# Patient Record
Sex: MALE | Race: WHITE | NOT HISPANIC OR LATINO | Employment: OTHER | URBAN - NONMETROPOLITAN AREA
[De-identification: names, ages, dates, MRNs, and addresses within clinical notes are randomized per-mention and may not be internally consistent; named-entity substitution may affect disease eponyms.]

---

## 2022-03-04 NOTE — PROGRESS NOTES
"Subjective    Mr. Diaz is 81 y.o. male    Chief Complaint: Elevated PSA    History of Present Illness  81-year-old new patient referred for elevated PSA of 8.0 on 2/3/2022 of 4.8 last summer August 2021.  He denies bothersome LUTS, hematuria, or flank pain.  No bone pain.    PSA  Date  8.030  2-3-22  4.810  8-4-21    The following portions of the patient's history were reviewed and updated as appropriate: allergies, current medications, past family history, past medical history, past social history, past surgical history and problem list.    Review of Systems      Current Outpatient Medications:   •  lisinopril-hydrochlorothiazide (PRINZIDE,ZESTORETIC) 20-25 MG per tablet, TAKE 1 TABLET BY MOUTH EVERY DAY FOR 90 DAYS, Disp: , Rfl:     History reviewed. No pertinent past medical history.    History reviewed. No pertinent surgical history.    Social History     Socioeconomic History   • Marital status:        History reviewed. No pertinent family history.    Objective    Temp 98.1 °F (36.7 °C)   Ht 177.8 cm (70\")   Wt 90 kg (198 lb 6.4 oz)   BMI 28.47 kg/m²     Physical Exam  Penis and testicles normal.  Digital rectal exam reveals 40 to 50 g prostate with firm nodule on the right.      No results found for this or any previous visit.  Assessment and Plan    Diagnoses and all orders for this visit:    1. Elevated prostate specific antigen (PSA) (Primary)  -     Cancel: POC Urinalysis Dipstick, Multipro  -     Cancel: PSA DIAGNOSTIC; Future    2. Prostate nodule      Rise in his PSA up to 8 from 4 last year.  We had a long discussion regarding the natural history of PSA including the AUA guidelines for no PSA screening beyond the age of 75.  I recommended he follow-up with me next Thursday in my [facility] office with repeat preclinic PSA for further discussion especially regarding his right-sided nodule.  We discussed options for prostate biopsy, close PSA surveillance, etc.  We will discuss further at visit " next week.      This document has been signed by TITA Berrios MD on March 15, 2022 19:39 CDT

## 2022-03-14 ENCOUNTER — OFFICE VISIT (OUTPATIENT)
Dept: UROLOGY | Facility: CLINIC | Age: 82
End: 2022-03-14

## 2022-03-14 VITALS — TEMPERATURE: 98.1 F | WEIGHT: 198.4 LBS | BODY MASS INDEX: 28.4 KG/M2 | HEIGHT: 70 IN

## 2022-03-14 DIAGNOSIS — R97.20 ELEVATED PROSTATE SPECIFIC ANTIGEN (PSA): ICD-10-CM

## 2022-03-14 DIAGNOSIS — R97.20 ELEVATED PROSTATE SPECIFIC ANTIGEN (PSA): Primary | ICD-10-CM

## 2022-03-14 DIAGNOSIS — N40.2 PROSTATE NODULE: ICD-10-CM

## 2022-03-14 PROCEDURE — 99202 OFFICE O/P NEW SF 15 MIN: CPT | Performed by: UROLOGY

## 2022-03-14 RX ORDER — LISINOPRIL AND HYDROCHLOROTHIAZIDE 25; 20 MG/1; MG/1
TABLET ORAL
COMMUNITY
Start: 2022-02-03

## 2022-03-15 LAB — PSA SERPL-MCNC: 6.58 NG/ML (ref 0–4)

## 2022-03-15 NOTE — PROGRESS NOTES
"Subjective    Mr. Diaz is 81 y.o. male    Chief Complaint: Elevated PSA    History of Present Illness  81-year-old established patient seen last week for history of elevated PSA to 8.0 in February up from 4.8 last summer.  PSA was rechecked last week and found to be elevated but improved down to 6.58.  He denies LUTS, or bone pain.  Rectal exam last week did show for gram probably with firm nodule on the right.    Lab Results   Component Value Date    PSA 6.580 (H) 03/14/2022       The following portions of the patient's history were reviewed and updated as appropriate: allergies, current medications, past family history, past medical history, past social history, past surgical history and problem list.    Review of Systems      Current Outpatient Medications:   •  lisinopril-hydrochlorothiazide (PRINZIDE,ZESTORETIC) 20-25 MG per tablet, TAKE 1 TABLET BY MOUTH EVERY DAY FOR 90 DAYS, Disp: , Rfl:     History reviewed. No pertinent past medical history.    History reviewed. No pertinent surgical history.    Social History     Socioeconomic History   • Marital status:    Tobacco Use   • Smoking status: Never Smoker   • Smokeless tobacco: Never Used   Vaping Use   • Vaping Use: Never used   Substance and Sexual Activity   • Alcohol use: Never   • Drug use: Never   • Sexual activity: Defer       History reviewed. No pertinent family history.    Objective    Temp 97.6 °F (36.4 °C)   Ht 177.8 cm (70\")   Wt 89.8 kg (198 lb)   BMI 28.41 kg/m²     Physical Exam        Results for orders placed or performed in visit on 03/14/22   PSA Diagnostic    Specimen: Blood   Result Value Ref Range    PSA 6.580 (H) 0.000 - 4.000 ng/mL     Assessment and Plan    Diagnoses and all orders for this visit:    1. Elevated prostate specific antigen (PSA) (Primary)  -     PSA DIAGNOSTIC; Future    2. Prostate nodule      PSA remains elevated but improved from previous level.  We again discussed options for prostate biopsy given his " nodule on the right versus close PSA surveillance.  At this point given his age, patient would like to closely observe this for now and follow-up with me in 3 months with repeat preclinic PSA.      This document has been signed by TITA Berrios MD on March 26, 2022 22:30 CDT

## 2022-03-24 ENCOUNTER — OFFICE VISIT (OUTPATIENT)
Dept: UROLOGY | Facility: CLINIC | Age: 82
End: 2022-03-24

## 2022-03-24 VITALS — BODY MASS INDEX: 28.35 KG/M2 | TEMPERATURE: 97.6 F | HEIGHT: 70 IN | WEIGHT: 198 LBS

## 2022-03-24 DIAGNOSIS — N40.2 PROSTATE NODULE: ICD-10-CM

## 2022-03-24 DIAGNOSIS — R97.20 ELEVATED PROSTATE SPECIFIC ANTIGEN (PSA): Primary | ICD-10-CM

## 2022-03-24 PROCEDURE — 99213 OFFICE O/P EST LOW 20 MIN: CPT | Performed by: UROLOGY

## 2022-07-18 ENCOUNTER — LAB (OUTPATIENT)
Dept: INTERNAL MEDICINE | Facility: CLINIC | Age: 82
End: 2022-07-18
Payer: MEDICARE

## 2022-07-18 DIAGNOSIS — R97.20 ELEVATED PROSTATE SPECIFIC ANTIGEN (PSA): Primary | ICD-10-CM

## 2022-07-19 LAB — PSA SERPL-MCNC: 6.5 NG/ML (ref 0–4)

## 2022-07-19 NOTE — PROGRESS NOTES
"Subjective    Mr. Diaz is 82 y.o. male    Chief Complaint: Elevated PSA    History of Present Illness    82-year-old established patient follow-up for history of elevated PSA to 8.0 in February 2022 up from 4.8 in 2021.  PSA last week was stable at 6.5 from 6.58 in March.    He denies LUTS, or bone pain.  Rectal exam in March revealed 40 to 50 g smooth prostate prostate with small nodule on the right.      Lab Results   Component Value Date    PSA 6.5 (H) 07/18/2022    PSA 6.580 (H) 03/14/2022       The following portions of the patient's history were reviewed and updated as appropriate: allergies, current medications, past family history, past medical history, past social history, past surgical history and problem list.    Review of Systems      Current Outpatient Medications:   •  lisinopril-hydrochlorothiazide (PRINZIDE,ZESTORETIC) 20-25 MG per tablet, TAKE 1 TABLET BY MOUTH EVERY DAY FOR 90 DAYS, Disp: , Rfl:     History reviewed. No pertinent past medical history.    History reviewed. No pertinent surgical history.    Social History     Socioeconomic History   • Marital status:    Tobacco Use   • Smoking status: Never Smoker   • Smokeless tobacco: Never Used   Vaping Use   • Vaping Use: Never used   Substance and Sexual Activity   • Alcohol use: Never   • Drug use: Never   • Sexual activity: Defer       History reviewed. No pertinent family history.    Objective    Temp 98.2 °F (36.8 °C)   Ht 177.8 cm (70\")   Wt 89.8 kg (198 lb)   BMI 28.41 kg/m²     Physical Exam        Results for orders placed or performed in visit on 07/21/22   POC Urinalysis Dipstick, Multipro    Specimen: Urine   Result Value Ref Range    Color Yellow Yellow, Straw, Dark Yellow, Beba    Clarity, UA Clear Clear    Glucose, UA Negative Negative mg/dL    Bilirubin Negative Negative    Ketones, UA Negative Negative    Specific Gravity  1.020 1.005 - 1.030    Blood, UA Negative Negative    pH, Urine 7.5 5.0 - 8.0    Protein, POC " Negative Negative mg/dL    Urobilinogen, UA Normal Normal    Nitrite, UA Negative Negative    Leukocytes Negative Negative     Assessment and Plan    Diagnoses and all orders for this visit:    1. Elevated prostate specific antigen (PSA) (Primary)  -     POC Urinalysis Dipstick, Multipro      Mildly elevated PSA but stable from March and decreased from prior level.  Given his advanced age and PSA stability, patient does not wish to proceed with prostate biopsy at this time.  We did discuss the possibility of the presence of prostate cancer but that I do not perform PSA screening beyond the age of 75 per AUA guidelines.  He is agreeable with a watchful waiting approach.  He will follow-up with me on an as-needed basis.      This document has been signed by TITA Berrios MD on July 22, 2022 19:04 CDT

## 2022-07-21 ENCOUNTER — OFFICE VISIT (OUTPATIENT)
Dept: UROLOGY | Facility: CLINIC | Age: 82
End: 2022-07-21

## 2022-07-21 VITALS — TEMPERATURE: 98.2 F | HEIGHT: 70 IN | BODY MASS INDEX: 28.35 KG/M2 | WEIGHT: 198 LBS

## 2022-07-21 DIAGNOSIS — R97.20 ELEVATED PROSTATE SPECIFIC ANTIGEN (PSA): Primary | ICD-10-CM

## 2022-07-21 LAB
BILIRUB BLD-MCNC: NEGATIVE MG/DL
CLARITY, POC: CLEAR
COLOR UR: YELLOW
GLUCOSE UR STRIP-MCNC: NEGATIVE MG/DL
KETONES UR QL: NEGATIVE
LEUKOCYTE EST, POC: NEGATIVE
NITRITE UR-MCNC: NEGATIVE MG/ML
PH UR: 7.5 [PH] (ref 5–8)
PROT UR STRIP-MCNC: NEGATIVE MG/DL
RBC # UR STRIP: NEGATIVE /UL
SP GR UR: 1.02 (ref 1–1.03)
UROBILINOGEN UR QL: NORMAL

## 2022-07-21 PROCEDURE — 81001 URINALYSIS AUTO W/SCOPE: CPT | Performed by: UROLOGY

## 2022-07-21 PROCEDURE — 99213 OFFICE O/P EST LOW 20 MIN: CPT | Performed by: UROLOGY

## 2023-03-22 ENCOUNTER — TRANSCRIBE ORDERS (OUTPATIENT)
Dept: ADMINISTRATIVE | Facility: HOSPITAL | Age: 83
End: 2023-03-22
Payer: MEDICARE

## 2023-03-22 ENCOUNTER — HOSPITAL ENCOUNTER (OUTPATIENT)
Dept: GENERAL RADIOLOGY | Facility: HOSPITAL | Age: 83
Discharge: HOME | End: 2023-03-22
Admitting: NURSE PRACTITIONER
Payer: MEDICARE

## 2023-03-22 DIAGNOSIS — M79.674 PAIN OF RIGHT GREAT TOE: ICD-10-CM

## 2023-03-22 DIAGNOSIS — M79.674 PAIN OF RIGHT GREAT TOE: Primary | ICD-10-CM

## 2023-03-22 PROCEDURE — 73660 X-RAY EXAM OF TOE(S): CPT

## 2023-04-04 NOTE — PROGRESS NOTES
"Subjective    Mr. Diaz is 82 y.o. male    Chief Complaint: Elevated PSA    History of Present Illness    82-year-old established patient followup for worsening elevated PSA now up to 12.4 from 8.0 last year on 2/3/2022 and 4.8 in August 2021.  He denies bothersome LUTS, hematuria, or flank pain.  No bone pain.  We had previously discussed cessation of PSA testing last year due to his age but he had it checked at his primary care this year anyhow.     PSA                 Date  12.400  3-23-23  8.030               2-3-22  4.810               8-4-21       The following portions of the patient's history were reviewed and updated as appropriate: allergies, current medications, past family history, past medical history, past social history, past surgical history and problem list.    Review of Systems      Current Outpatient Medications:   •  amLODIPine (NORVASC) 5 MG tablet, TAKE 1 TABLET BY MOUTH EVERY DAY FOR 30 DAYS, Disp: , Rfl:   •  lisinopril-hydrochlorothiazide (PRINZIDE,ZESTORETIC) 20-25 MG per tablet, TAKE 1 TABLET BY MOUTH EVERY DAY FOR 90 DAYS, Disp: , Rfl:   •  levoFLOXacin (LEVAQUIN) 500 MG tablet, 1 tab by mouth the evening prior to prostate biopsy, Disp: 1 tablet, Rfl: 0  •  sodium phosphate (FLEET) 7-19 GM/118ML enema, Use rectally the morning of prostate biopsy, Disp: 1 enema, Rfl: 0    History reviewed. No pertinent past medical history.    History reviewed. No pertinent surgical history.    Social History     Socioeconomic History   • Marital status:    Tobacco Use   • Smoking status: Never   • Smokeless tobacco: Never   Vaping Use   • Vaping Use: Never used   Substance and Sexual Activity   • Alcohol use: Never   • Drug use: Never   • Sexual activity: Defer       History reviewed. No pertinent family history.    Objective    Temp 97.8 °F (36.6 °C)   Ht 177.8 cm (70\")   Wt 89.8 kg (198 lb)   BMI 28.41 kg/m²     Physical Exam        Results for orders placed or performed in visit on 04/06/23 "   POC Urinalysis Dipstick, Multipro    Specimen: Urine   Result Value Ref Range    Color Yellow Yellow, Straw, Dark Yellow, Beba    Clarity, UA Clear Clear    Glucose, UA Negative Negative mg/dL    Bilirubin Negative Negative    Ketones, UA Negative Negative    Specific Gravity  1.015 1.005 - 1.030    Blood, UA Negative Negative    pH, Urine 5.5 5.0 - 8.0    Protein, POC Negative Negative mg/dL    Urobilinogen, UA Normal Normal, 0.2 E.U./dL    Nitrite, UA Negative Negative    Leukocytes Negative Negative     Assessment and Plan    Diagnoses and all orders for this visit:    1. Elevated prostate specific antigen (PSA) (Primary)  -     POC Urinalysis Dipstick, Multipro  -     Biopsy Prostate  -     levoFLOXacin (LEVAQUIN) 500 MG tablet; 1 tab by mouth the evening prior to prostate biopsy  Dispense: 1 tablet; Refill: 0  -     sodium phosphate (FLEET) 7-19 GM/118ML enema; Use rectally the morning of prostate biopsy  Dispense: 1 enema; Refill: 0      Worsening elevated PSA.  We again had a long discussion regarding the natural history of PSA including possible causes including prostate cancer, prostate enlargement, etc.  Patient would like to schedule prostate biopsy in office.  He will follow-up with me in my [facility] office next week for transrectal ultrasound-guided prostate biopsy.  We discussed risks of the procedure including but not limited to infection, bleeding, need for additional procedures, possibility of findings to stop finding cancer.  He voiced understanding and provided informed consent to proceed.      This document has been signed by TITA eBrrios MD on April 7, 2023 21:10 CDT

## 2023-04-06 ENCOUNTER — OFFICE VISIT (OUTPATIENT)
Dept: UROLOGY | Facility: CLINIC | Age: 83
End: 2023-04-06
Payer: MEDICARE

## 2023-04-06 VITALS — HEIGHT: 70 IN | BODY MASS INDEX: 28.35 KG/M2 | WEIGHT: 198 LBS | TEMPERATURE: 97.8 F

## 2023-04-06 DIAGNOSIS — R97.20 ELEVATED PROSTATE SPECIFIC ANTIGEN (PSA): Primary | ICD-10-CM

## 2023-04-06 LAB
BILIRUB BLD-MCNC: NEGATIVE MG/DL
CLARITY, POC: CLEAR
COLOR UR: YELLOW
GLUCOSE UR STRIP-MCNC: NEGATIVE MG/DL
KETONES UR QL: NEGATIVE
LEUKOCYTE EST, POC: NEGATIVE
NITRITE UR-MCNC: NEGATIVE MG/ML
PH UR: 5.5 [PH] (ref 5–8)
PROT UR STRIP-MCNC: NEGATIVE MG/DL
RBC # UR STRIP: NEGATIVE /UL
SP GR UR: 1.01 (ref 1–1.03)
UROBILINOGEN UR QL: NORMAL

## 2023-04-06 PROCEDURE — 99214 OFFICE O/P EST MOD 30 MIN: CPT | Performed by: UROLOGY

## 2023-04-06 PROCEDURE — 1160F RVW MEDS BY RX/DR IN RCRD: CPT | Performed by: UROLOGY

## 2023-04-06 PROCEDURE — 81001 URINALYSIS AUTO W/SCOPE: CPT | Performed by: UROLOGY

## 2023-04-06 PROCEDURE — 1159F MED LIST DOCD IN RCRD: CPT | Performed by: UROLOGY

## 2023-04-06 RX ORDER — LEVOFLOXACIN 500 MG/1
TABLET, FILM COATED ORAL
Qty: 1 TABLET | Refills: 0 | Status: SHIPPED | OUTPATIENT
Start: 2023-04-06

## 2023-04-06 RX ORDER — MAGNESIUM HYDROXIDE 1200 MG/15ML
LIQUID ORAL
Qty: 1 ENEMA | Refills: 0 | Status: SHIPPED | OUTPATIENT
Start: 2023-04-06

## 2023-04-06 RX ORDER — AMLODIPINE BESYLATE 5 MG/1
TABLET ORAL
COMMUNITY
Start: 2023-03-22

## 2023-04-06 NOTE — LETTER
April 7, 2023     Rebecca Valderrama, APRN  [address] [place] [address]    Patient: Adan Diaz   YOB: 1940   Date of Visit: 4/6/2023     Dear Dr. Valderrama, APRN:    Thank you for referring Adan Diaz to me for evaluation. Below are the relevant portions of my assessment and plan of care.    If you have questions, please do not hesitate to call me. I look forward to following Adan along with you.         Sincerely,        Tommie Berrios MD        CC: No Recipients      Progress Notes:  Subjective    Mr. Diaz is 82 y.o. male    Chief Complaint: Elevated PSA    History of Present Illness    82-year-old established patient followup for worsening elevated PSA now up to 12.4 from 8.0 last year on 2/3/2022 and 4.8 in August 2021.  He denies bothersome LUTS, hematuria, or flank pain.  No bone pain.  We had previously discussed cessation of PSA testing last year due to his age but he had it checked at his primary care this year anyhow.     PSA                 Date  12.400  3-23-23  8.030               2-3-22  4.810               8-4-21       The following portions of the patient's history were reviewed and updated as appropriate: allergies, current medications, past family history, past medical history, past social history, past surgical history and problem list.    Review of Systems      Current Outpatient Medications:   •  amLODIPine (NORVASC) 5 MG tablet, TAKE 1 TABLET BY MOUTH EVERY DAY FOR 30 DAYS, Disp: , Rfl:   •  lisinopril-hydrochlorothiazide (PRINZIDE,ZESTORETIC) 20-25 MG per tablet, TAKE 1 TABLET BY MOUTH EVERY DAY FOR 90 DAYS, Disp: , Rfl:   •  levoFLOXacin (LEVAQUIN) 500 MG tablet, 1 tab by mouth the evening prior to prostate biopsy, Disp: 1 tablet, Rfl: 0  •  sodium phosphate (FLEET) 7-19 GM/118ML enema, Use rectally the morning of prostate biopsy, Disp: 1 enema, Rfl: 0    History reviewed. No pertinent past medical history.    History reviewed. No pertinent surgical history.    Social  "History     Socioeconomic History   • Marital status:    Tobacco Use   • Smoking status: Never   • Smokeless tobacco: Never   Vaping Use   • Vaping Use: Never used   Substance and Sexual Activity   • Alcohol use: Never   • Drug use: Never   • Sexual activity: Defer       History reviewed. No pertinent family history.    Objective    Temp 97.8 °F (36.6 °C)   Ht 177.8 cm (70\")   Wt 89.8 kg (198 lb)   BMI 28.41 kg/m²     Physical Exam        Results for orders placed or performed in visit on 04/06/23   POC Urinalysis Dipstick, Multipro    Specimen: Urine   Result Value Ref Range    Color Yellow Yellow, Straw, Dark Yellow, Beba    Clarity, UA Clear Clear    Glucose, UA Negative Negative mg/dL    Bilirubin Negative Negative    Ketones, UA Negative Negative    Specific Gravity  1.015 1.005 - 1.030    Blood, UA Negative Negative    pH, Urine 5.5 5.0 - 8.0    Protein, POC Negative Negative mg/dL    Urobilinogen, UA Normal Normal, 0.2 E.U./dL    Nitrite, UA Negative Negative    Leukocytes Negative Negative     Assessment and Plan    Diagnoses and all orders for this visit:    1. Elevated prostate specific antigen (PSA) (Primary)  -     POC Urinalysis Dipstick, Multipro  -     Biopsy Prostate  -     levoFLOXacin (LEVAQUIN) 500 MG tablet; 1 tab by mouth the evening prior to prostate biopsy  Dispense: 1 tablet; Refill: 0  -     sodium phosphate (FLEET) 7-19 GM/118ML enema; Use rectally the morning of prostate biopsy  Dispense: 1 enema; Refill: 0      Worsening elevated PSA.  We again had a long discussion regarding the natural history of PSA including possible causes including prostate cancer, prostate enlargement, etc.  Patient would like to schedule prostate biopsy in office.  He will follow-up with me in my [facility] office next week for transrectal ultrasound-guided prostate biopsy.  We discussed risks of the procedure including but not limited to infection, bleeding, need for additional procedures, possibility of " findings to stop finding cancer.  He voiced understanding and provided informed consent to proceed.      This document has been signed by TITA Berrios MD on April 7, 2023 21:10 CDT

## 2023-04-13 ENCOUNTER — PROCEDURE VISIT (OUTPATIENT)
Dept: UROLOGY | Facility: CLINIC | Age: 83
End: 2023-04-13
Payer: MEDICARE

## 2023-04-13 DIAGNOSIS — R97.20 ELEVATED PROSTATE SPECIFIC ANTIGEN (PSA): Primary | ICD-10-CM

## 2023-04-13 PROCEDURE — G0416 PROSTATE BIOPSY, ANY MTHD: HCPCS | Performed by: UROLOGY

## 2023-04-13 PROCEDURE — 76942 ECHO GUIDE FOR BIOPSY: CPT | Performed by: UROLOGY

## 2023-04-13 PROCEDURE — 55700 PR PROSTATE NEEDLE BIOPSY ANY APPROACH: CPT | Performed by: UROLOGY

## 2023-04-14 NOTE — PROGRESS NOTES
CC: I am here for my prostate biopsy    TRUS PROSTATE WITH BIOPSY PROCEDURE NOTE  Indications:  Elevated PSA    Pre-operative prep:  fleets enema and oral antibiotic      Procedure:    After proper identification of patient and procedure, patient was placed in the left later decubitus position.  2% lidocaine jelly was instilled per rectum  for topical anesthesia.  The ultrasound probe was gently inserted per rectum. Prostate was scanned from the base of the bladder to the apex.  20 cc of 1% lidocaine plain was then used to perform a prostate nerve block injecting the junction of the seminal vesicle and bladder laterally.      Prostate length: 47.3 mm    Prostate width: 51.3 mm    Prostate height: 36.6 mm    Prostate volume: 46.5 cc    PSA density: 0.27    Abnormal findings:  No lesions noted and Transition zone enlargement    Median lobe:  no    A total of 12 biopsies were taken from the base, apex, and mid portion of the gland on both the right and the left sides.     Patient tolerated the procedure well    Complications: none    Estimated blood loss: minimal    Mr. Diaz was given instructions for follow up.  He will notify the office if he has excessive hematuria, hematochezia, fevers, perineal, or abdominal pain.    Follow up:   He will follow up in 1 week  for pathology results    Diagnoses and all orders for this visit:    1. Elevated prostate specific antigen (PSA) (Primary)  -     Tissue Pathology Exam; Future  -     Tissue Pathology Exam

## 2023-04-18 LAB
CYTO UR: NORMAL
LAB AP CASE REPORT: NORMAL
Lab: NORMAL
PATH REPORT.FINAL DX SPEC: NORMAL
PATH REPORT.GROSS SPEC: NORMAL

## 2023-04-20 ENCOUNTER — OFFICE VISIT (OUTPATIENT)
Dept: UROLOGY | Facility: CLINIC | Age: 83
End: 2023-04-20
Payer: MEDICARE

## 2023-04-20 DIAGNOSIS — C61 MALIGNANT NEOPLASM OF PROSTATE: Primary | ICD-10-CM

## 2023-04-20 DIAGNOSIS — R97.20 ELEVATED PROSTATE SPECIFIC ANTIGEN (PSA): ICD-10-CM

## 2023-04-20 PROCEDURE — 1160F RVW MEDS BY RX/DR IN RCRD: CPT | Performed by: UROLOGY

## 2023-04-20 PROCEDURE — 1159F MED LIST DOCD IN RCRD: CPT | Performed by: UROLOGY

## 2023-04-20 PROCEDURE — 99214 OFFICE O/P EST MOD 30 MIN: CPT | Performed by: UROLOGY

## 2023-04-20 NOTE — LETTER
April 20, 2023     Rebecca Valderrama, APRN  [address] [place] [address]    Patient: Adan Diaz   YOB: 1940   Date of Visit: 4/20/2023     Dear Dr. Valderrama, APRN:    Thank you for referring Adan Diaz to me for evaluation. Below are the relevant portions of my assessment and plan of care.    If you have questions, please do not hesitate to call me. I look forward to following Adan along with you.         Sincerely,        Tommie Berrios MD        CC: No Recipients      Progress Notes:  Subjective    Mr. Diaz is 82 y.o. male    Chief Complaint: Elevated PSA    History of Present Illness    82-year-old male established patient followup to review results of prostate biopsy last week done for elevated PSA of 12.4.  PSA shows multifocal bilateral prostate cancer with [place] 9 disease right mid involving 90% of 3 of 3 cores, Dori 8 cancer at right base, and Dori 7 cancer in all other cores.  He denies bothersome LUTS, hematuria, or flank pain.  No bone pain.       PSA                 Date  12.400             3-23-23  8.030               2-3-22  4.810               8-4-21    The following portions of the patient's history were reviewed and updated as appropriate: allergies, current medications, past family history, past medical history, past social history, past surgical history and problem list.    Review of Systems      Current Outpatient Medications:   •  amLODIPine (NORVASC) 5 MG tablet, TAKE 1 TABLET BY MOUTH EVERY DAY FOR 30 DAYS, Disp: , Rfl:   •  levoFLOXacin (LEVAQUIN) 500 MG tablet, 1 tab by mouth the evening prior to prostate biopsy, Disp: 1 tablet, Rfl: 0  •  lisinopril-hydrochlorothiazide (PRINZIDE,ZESTORETIC) 20-25 MG per tablet, TAKE 1 TABLET BY MOUTH EVERY DAY FOR 90 DAYS, Disp: , Rfl:   •  sodium phosphate (FLEET) 7-19 GM/118ML enema, Use rectally the morning of prostate biopsy, Disp: 1 enema, Rfl: 0    No past medical history on file.    No past surgical history on  file.    Social History     Socioeconomic History   • Marital status:    Tobacco Use   • Smoking status: Never   • Smokeless tobacco: Never   Vaping Use   • Vaping Use: Never used   Substance and Sexual Activity   • Alcohol use: Never   • Drug use: Never   • Sexual activity: Defer       No family history on file.    Objective    There were no vitals taken for this visit.    Physical Exam        Results for orders placed or performed in visit on 04/13/23   Tissue Pathology Exam    Specimen: A: Prostate; Tissue    B: Prostate; Tissue    C: Prostate; Tissue    D: Prostate; Tissue    E: Prostate; Tissue    F: Prostate; Tissue   Result Value Ref Range    Note to Patients       This report may contain a detailed description of human tissue sent by a health care provider to the laboratory for pathologic evaluation. The content of this report is essential for diagnosis and may provide important critical findings. This information may be unfamiliar to patients to review without a medical professional present. It is advised that the patient review this report in the presence of a health care provider who can answer questions and explain the results.      Case Report       Surgical Pathology Report                         Case: [id number]                                  Authorizing Provider:  Tommie Berrios MD      Collected:           04/13/2023 02:05 PM          Ordering Location:     [facility]     Received:            04/13/2023 02:06 PM                                 [facility] UR[facility] [place]                                                         Pathologist:           Alina Jiménez MD                                                         Specimens:   1) - Prostate, Left apex                                                                            2) - Prostate, Left mid                                                                             3) - Prostate, Left base                                                                             4) - Prostate, Right apex                                                                           5) - Prostate, Right mid                                                                             6) - Prostate, Right base                                                                  Final Diagnosis       Prostate biopsies:    1.  Left apex:  - Acinar adenocarcinoma, grade group 3 ([place] score 4+3 = 7).  - Involving 2 of 3 cores, 51 to 60% and 1 to 5% of prostatic tissue.  - Percentage of pattern 4: 81 to 90%.    2.  Left mid:  - Acinar adenocarcinoma, grade group 3 (Dori score 4+3 = 7).  - Involving 2 of 2 cores, 51 to 60% of prostatic tissue per core.  - Percentage of pattern 4: 81 to 90%.    3.  Left base:  - Acinar adenocarcinoma, grade group 3 (Dori score 4+3 = 7).  - Involving 2 of 2 cores, 41-50% of prostatic tissue per core.  - Percentage of pattern 4: 71 to 80%.  - Perineural invasion identified.    4.  Right apex:  - Acinar adenocarcinoma, grade group 3 (Dori score 4+3 = 7).  - Involving 2 of 2 cores, 71 to 80% and 21 to 30% of prostatic tissue.  - Percentage of pattern 4: Greater than 90%.    5.  Right mid:  - Acinar adenocarcinoma, grade group 5 (Dori score 4+5 = 9).  - Involving 3 of 3 cores, greater than 90% of prostatic tissue per core.  - Percentage of pattern 5: 1 to 5%.    6.  Right base:  - Acinar adenocarcinoma, grade group 4 (Dori score 4+4 = 8).  - Involving 2 of 2 cores, 60 to 70% and 40 to 50% of prostatic tissue.  - Perineural invasion identified.      Gross Description       1. Prostate.  Received in formalin labeled with the patient's name, date of birth, and “left apex prostate needle biopsy”.  The specimen consists of 3 pink/gray soft tissue core biopsy measuring from 0.5 cm to in length by less than 0.1 cm in diameter.  The specimen is inked with hematoxylin and totally submitted in (block 1A).  2.  Prostate.  Received in formalin labeled with the patient's name, date of birth, and “left mid prostate needle biopsy”.  The specimen consists of 2 pink/gray soft tissue core biopsy measuring 0.8 cm and 1.1 cm in length by less than 0.1 cm in diameter.  The specimen is inked with hematoxylin and totally submitted in (block 2A).  3. Prostate.  Received in formalin labeled with the patient's name, date of birth, and “left base prostate needle biopsy”.  The specimen consists of 2 pink/gray soft tissue core biopsy measuring 0.8 cm and 1.0 cm in length by less than 0.1 cm in diameter.  The specimen is inked with hematoxylin and totally submitted in (block 3A).  4. Prostate.  Received in formalin labeled with the patient's name, date of birth, and “right apex prostate needle biopsy”.  The specimen consists of 3 pink/gray soft tissue core biopsy measuring from 0.4 cm to 0.8 cm in length by less than 0.1 cm in diameter.  The specimen is inked with hematoxylin and totally submitted in (block 4A).  5. Prostate.  Received in formalin labeled with the patient's name, date of birth, and “right mid prostate needle biopsy”.  The specimen consists of 3 pink/gray soft tissue core biopsy measuring from 1.3 cm to 1.7 cm in length by less than 0.1 cm in diameter.  The specimen is inked with hematoxylin and totally submitted in (block 5A).  6. Prostate.  Received in formalin labeled with the patient's name, date of birth, and “right base prostate needle biopsy”.  The specimen consists of 3 Telfa pads with a wispy gray-white fragment and 2 pink/gray soft tissue core biopsies measuring from 0.1 cm to 1.3 cm in length by less than 0.1 cm in diameter.  The specimen is inked with hematoxylin and totally submitted in (block 6A).  The specimen container and contents are retained for additional examination if needed      Microscopic Description       Microscopic evaluation completed.       Assessment and Plan    Diagnoses and all orders for this  visit:    1. Malignant neoplasm of prostate (Primary)  -     MRI Pelvis With & Without Contrast; Future  -     NM Bone Scan Whole Body; Future  -     Ambulatory Referral to Radiation Oncology-[facility]    2. Elevated prostate specific antigen (PSA)      We spent 30 minutes today discussing the natural history of prostate cancer and all management options including active surveillance, radical prostatectomy, radiation therapy, cryotherapy, and androgen deprivation therapy.  He was provided a copy of his pathology report today.  All questions were answered.  He was also referred to the A's website for patients for further information on prostate cancer.     Given his high risk [place] 9 prostate cancer, I did not recommend active surveillance.  I do not recommend surgery given his advanced age.  I did recommend consultation with radiation oncology to consider radiation after getting a prostate MRI and bone scan.    We discussed that if he does decide to go with radiation, he will follow-up with my partner Dr. Titus who does SpaceOAR and fiducial prostate markers.  He can then follow-up with me in my [facility] clinic for PSA follow-ups after completing radiation.    I spent approximately 30 minutes providing clinical care for this patient; including review of patient's chart and provider documentation, face to face time spent with patient in examination room (obtaining history, performing physical exam, discussing diagnosis and management options), placing orders, and completing patient documentation.         This document has been signed by TITA Berrios MD on April 20, 2023 13:18 CDT

## 2023-05-01 ENCOUNTER — OFFICE VISIT (OUTPATIENT)
Dept: UROLOGY | Facility: CLINIC | Age: 83
End: 2023-05-01
Payer: MEDICARE

## 2023-05-01 VITALS — HEIGHT: 70 IN | BODY MASS INDEX: 27.2 KG/M2 | TEMPERATURE: 98.2 F | WEIGHT: 190 LBS

## 2023-05-01 DIAGNOSIS — R30.0 BURNING WITH URINATION: Primary | ICD-10-CM

## 2023-05-01 DIAGNOSIS — N41.8 OTHER PROSTATIC INFLAMMATORY DISEASES: ICD-10-CM

## 2023-05-01 DIAGNOSIS — K40.90 NON-RECURRENT UNILATERAL INGUINAL HERNIA WITHOUT OBSTRUCTION OR GANGRENE: ICD-10-CM

## 2023-05-01 LAB
BILIRUB BLD-MCNC: NEGATIVE MG/DL
CLARITY, POC: CLEAR
COLOR UR: YELLOW
GLUCOSE UR STRIP-MCNC: NEGATIVE MG/DL
KETONES UR QL: NEGATIVE
LEUKOCYTE EST, POC: NEGATIVE
NITRITE UR-MCNC: NEGATIVE MG/ML
PH UR: 6 [PH] (ref 5–8)
PROT UR STRIP-MCNC: NEGATIVE MG/DL
RBC # UR STRIP: ABNORMAL /UL
SP GR UR: 1.01 (ref 1–1.03)
UROBILINOGEN UR QL: NORMAL

## 2023-05-01 PROCEDURE — 99213 OFFICE O/P EST LOW 20 MIN: CPT | Performed by: UROLOGY

## 2023-05-01 PROCEDURE — 1160F RVW MEDS BY RX/DR IN RCRD: CPT | Performed by: UROLOGY

## 2023-05-01 PROCEDURE — 1159F MED LIST DOCD IN RCRD: CPT | Performed by: UROLOGY

## 2023-05-01 PROCEDURE — 81001 URINALYSIS AUTO W/SCOPE: CPT | Performed by: UROLOGY

## 2023-05-01 RX ORDER — MELOXICAM 7.5 MG/1
7.5 TABLET ORAL DAILY
Qty: 30 TABLET | Refills: 1 | Status: SHIPPED | OUTPATIENT
Start: 2023-05-01 | End: 2023-06-30

## 2023-05-01 NOTE — PROGRESS NOTES
"82-year-old male called to be seen today for \"burning\" in his penis that has been constant since his prostate biopsy.  He is also having some discomfort in his left inguinal region radiating to his left testicle.  UA today shows microscopic blood consistent with recent prostate biopsy.  He has awaiting his appointment with radiation oncology after getting prostate MRI and bone scan.  He does have known umbilical hernia and left inguinal hernia.  Examination reveals tender left inguinal hernia.  Nontender umbilical hernia.  Normal testicles bilaterally, no masses or signs of epididymitis.    I recommended scheduled meloxicam for his likely postbiopsy prostate inflammation.  We will send today's urine for culture to rule out infection.  I recommended referral to general surgery to discuss his left inguinal hernia ± local hernia repair.    I spent approximately 20 minutes providing clinical care for this patient; including review of patient's chart and provider documentation, face to face time spent with patient in examination room (obtaining history, performing physical exam, discussing diagnosis and management options), placing orders, and completing patient documentation.       This document has been signed by TITA Berrios MD on May 1, 2023 17:12 CDT      "

## 2023-05-04 LAB
BACTERIA UR CULT: NORMAL
BACTERIA UR CULT: NORMAL

## 2023-05-05 ENCOUNTER — LAB (OUTPATIENT)
Dept: INTERNAL MEDICINE | Facility: CLINIC | Age: 83
End: 2023-05-05
Payer: MEDICARE

## 2023-05-05 DIAGNOSIS — R30.0 DYSURIA: Primary | ICD-10-CM

## 2023-05-07 LAB
BACTERIA UR CULT: NORMAL
BACTERIA UR CULT: NORMAL

## 2023-05-10 ENCOUNTER — HOSPITAL ENCOUNTER (OUTPATIENT)
Dept: MRI IMAGING | Facility: HOSPITAL | Age: 83
Discharge: HOME | End: 2023-05-10
Admitting: UROLOGY
Payer: MEDICARE

## 2023-05-10 DIAGNOSIS — C61 MALIGNANT NEOPLASM OF PROSTATE: ICD-10-CM

## 2023-05-10 LAB — CREAT BLDA-MCNC: 0.7 MG/DL (ref 0.6–1.3)

## 2023-05-10 PROCEDURE — A9577 INJ MULTIHANCE: HCPCS | Performed by: UROLOGY

## 2023-05-10 PROCEDURE — 72197 MRI PELVIS W/O & W/DYE: CPT

## 2023-05-10 PROCEDURE — 0 GADOBENATE DIMEGLUMINE 529 MG/ML SOLUTION: Performed by: UROLOGY

## 2023-05-10 PROCEDURE — 82565 ASSAY OF CREATININE: CPT

## 2023-05-10 RX ADMIN — GADOBENATE DIMEGLUMINE 17 ML: 529 INJECTION, SOLUTION INTRAVENOUS at 14:37

## 2023-05-15 ENCOUNTER — HOSPITAL ENCOUNTER (OUTPATIENT)
Dept: NUCLEAR MEDICINE | Facility: HOSPITAL | Age: 83
Discharge: HOME | End: 2023-05-15
Payer: MEDICARE

## 2023-05-15 ENCOUNTER — HOSPITAL ENCOUNTER (OUTPATIENT)
Dept: RADIATION ONCOLOGY | Facility: HOSPITAL | Age: 83
Setting detail: RADIATION/ONCOLOGY SERIES
End: 2023-05-15
Payer: MEDICARE

## 2023-05-15 DIAGNOSIS — C61 MALIGNANT NEOPLASM OF PROSTATE: ICD-10-CM

## 2023-05-15 PROBLEM — Z78.9 NON-SMOKER: Status: ACTIVE | Noted: 2023-05-15

## 2023-05-15 PROCEDURE — 78306 BONE IMAGING WHOLE BODY: CPT

## 2023-05-15 PROCEDURE — A9503 TC99M MEDRONATE: HCPCS | Performed by: UROLOGY

## 2023-05-15 PROCEDURE — 0 TECHNETIUM MEDRONATE KIT: Performed by: UROLOGY

## 2023-05-15 RX ORDER — TC 99M MEDRONATE 20 MG/10ML
21 INJECTION, POWDER, LYOPHILIZED, FOR SOLUTION INTRAVENOUS
Status: COMPLETED | OUTPATIENT
Start: 2023-05-15 | End: 2023-05-15

## 2023-05-15 RX ADMIN — TECHNETIUM TC 99M MEDRONATE 21 MILLICURIE: 25 INJECTION, POWDER, FOR SOLUTION INTRAVENOUS at 07:57

## 2023-05-16 NOTE — PROGRESS NOTES
[facility] Edgar MD      Philippe Haider, APRN  ___________________________________________________________  [facility]  Department of Radiation Oncology  [address] [place] [id number]  Office:  [phone]  Fax: [phone]                DATE:  05/17/2023  PATIENT: Adan Diaz  1940                         MEDICAL RECORD #:  [id number]          Chief Complaint   Patient presents with    Prostate Cancer                                               Adan Diaz is a 82 y.o. male that has been referred to our office to discuss radiotherapy considerations for Adenocarcinoma of the Prostate.     History of Present Illness:    08/13/2020 - PSA: 3.520    02/02/2022 - PSA: 8.030    03/14/2022 - PSA: 6.580    07/18/2022 - PSA: 6.5    03/23/2023 - PSA: 12.4    04/06/2023 - Appointment with :  Worsening elevated PSA.  We again had a long discussion regarding the natural history of PSA including possible causes including prostate cancer, prostate enlargement, etc.  Patient would like to schedule prostate biopsy in office.    He will follow-up with me in my [facility] office next week for transrectal ultrasound-guided prostate biopsy.    We discussed risks of the procedure including but not limited to infection, bleeding, need for additional procedures, possibility of findings to stop finding cancer.    He voiced understanding and provided informed consent to proceed.     04/13/2023 - Prostate biopsy per :  Left apex:  Acinar adenocarcinoma, grade group 3 (Colome score 4+3 = 7).  Involving 2 of 3 cores, 51 to 60% and 1 to 5% of prostatic tissue.  Percentage of pattern 4: 81 to 90%.  Left mid:  Acinar adenocarcinoma, grade group 3 (Colome score 4+3 = 7).  Involving 2 of 2 cores, 51 to 60% of prostatic tissue per core.  Percentage of pattern 4: 81 to 90%.  Left base:  Acinar adenocarcinoma, grade group 3 (Colome score 4+3 = 7).  Involving 2 of 2  cores, 41-50% of prostatic tissue per core.  Percentage of pattern 4: 71 to 80%.  Perineural invasion identified.  Right apex:  Acinar adenocarcinoma, grade group 3 ([place] score 4+3 = 7).  Involving 2 of 2 cores, 71 to 80% and 21 to 30% of prostatic tissue.  Percentage of pattern 4: Greater than 90%.  Right mid:  Acinar adenocarcinoma, grade group 5 ([place] score 4+5 = 9).  Involving 3 of 3 cores, greater than 90% of prostatic tissue per core.  Percentage of pattern 5: 1 to 5%.  Right base:  Acinar adenocarcinoma, grade group 4 ([place] score 4+4 = 8).  Involving 2 of 2 cores, 60 to 70% and 40 to 50% of prostatic tissue.  Perineural invasion identified.    04/20/2023 - Appointment with Dr. Berrios:  Given his high risk Dori 9 prostate cancer, I did not recommend active surveillance.  I do not recommend surgery given his advanced age. I did recommend consultation with radiation oncology to consider radiation after getting a prostate MRI and bone scan.  We discussed that if he does decide to go with radiation, he will follow-up with my partner Dr. Titus who does SpaceOAR and fiducial prostate markers.  He can then follow-up with me in my [facility] clinic for PSA follow-ups after completing radiation    05/10/2023 - MRI Pelvis with and without contrast:  Infiltrative diffuse T2 hypointense masslike signal throughout the prostate extending from base to apex involving the transitional and peripheral zone. Findings are in keeping with biopsy-proven diffuse prostate neoplasm. Within this background of signal abnormality, there is a focal area of markedly increased diffusion restriction in the RIGHT lateral prostate peripheral zone at the mid gland, likely correlating with the site of biopsy-proven Dori 9 disease.  Signal abnormality in the prostate broadly abuts the prostate margin without direct extension beyond the prostate margin. Additionally, there is signal abnormality and abnormal enhancement extending into the  base of the RIGHT seminal vesicle, concerning for invasion of the RIGHT seminal vesicle base.  No pelvic lymphadenopathy or suspicious pelvic bone lesion.    05/15/2023 - Bone Scan:  An area of somewhat asymmetric appearing uptake at the cervicothoracic junction on the posterior images. This is nonspecific. Consider follow-up MRI of the cervical spine that should also include T1 and T2.  Mild uptake within the remainder of the thoracal lumbar spine is likely degenerative. Mild degree of joint uptake elsewhere.    History obtained from  PATIENT and CHART    PAST MEDICAL HISTORY  Past Medical History:   Diagnosis Date    Hypertension     Prostate cancer    Erectile dysfunction    PAST SURGICAL HISTORY  Past Surgical History:   Procedure Laterality Date    HERNIA REPAIR  1979    KNEE SURGERY Left     PROSTATE BIOPSY  04/13/2023    Dr. Berrios      PAST ONCOLOGIC HISTORY: Patient denies previous history of malignancies, chemotherapy or radiation therapy.  He denies knowledge of vascular/connective tissue disorders or active rheumatologic disease.    FAMILY HISTORY  family history includes Colon cancer in his father.    SOCIAL HISTORY  Social History     Tobacco Use    Smoking status: Never    Smokeless tobacco: Never   Vaping Use    Vaping Use: Never used   Substance Use Topics    Alcohol use: Never    Drug use: Never     ALLERGIES  Patient has no known allergies.     MEDICATIONS    Current Outpatient Medications:     amLODIPine (NORVASC) 5 MG tablet, TAKE 1 TABLET BY MOUTH EVERY DAY FOR 30 DAYS, Disp: , Rfl:     lisinopril-hydrochlorothiazide (PRINZIDE,ZESTORETIC) 20-25 MG per tablet, TAKE 1 TABLET BY MOUTH EVERY DAY FOR 90 DAYS, Disp: , Rfl:     The following portions of the patient's history were reviewed and updated as appropriate: allergies, current medications, past family history, past medical history, past social history, past surgical history and problem list.    REVIEW OF SYSTEMS  Review of Systems  "  Constitutional: Negative.    HENT: Negative.     Eyes: Negative.    Respiratory: Negative.     Cardiovascular: Negative.    Gastrointestinal: Negative.    Endocrine: Negative.    Genitourinary: Negative.    Musculoskeletal: Negative.    Skin: Negative.    Allergic/Immunologic: Negative.    Neurological: Negative.    Hematological: Negative.    Psychiatric/Behavioral: Negative.        I have reviewed and confirmed the accuracy of the ROS as documented by the MA/LPN/RN Russel Smith MD    PHYSICAL EXAM  VITAL SIGNS:   Vitals:    05/17/23 1308   BP: 169/82   Pulse: 72   SpO2: 98%  Comment: room air   Weight: 86.6 kg (190 lb 14.4 oz)   Height: 177.8 cm (70\")   PainSc: 0-No pain      Physical Exam      Performance Status: ECOG (0) Fully active, able to carry on all predisease performance without restriction    Clinical Quality Measures  -Pain Documented by Standardized Tool, FPS Adan Diaz reports a pain score of 0. Given his pain assessment as noted, treatment options were discussed and the following options were decided upon as a follow-up plan to address the patient's pain:  No pain,no plan given .    -Advanced Care Planning Advance Care Planning  ACP discussion was held with the patient during this visit. Patient does not have an advance directive, information provided.     -Body Mass Index Screening and Follow-Up Plan BMI is >= 25 and <30. (Overweight) The following options were offered after discussion;: none (medical contraindication)    -Tobacco Use: Screening and Cessation Intervention Social History    Tobacco Use      Smoking status: Never      Smokeless tobacco: Never     PROSTATE PQRS #102   Bone Scan Use: Bone Scane done Pre-treatment or Post Diagnosis  Risk of Recurrence: High risk PSA > 20/GL 8-10/T3a  Measure #104  Adjuvant Hormonal TX: Hormonal Therapy Not Prescribed/Administered. Reason Not Specified  Recurrence Risk: High risk PSA > 20/GL 8-10/T3a    ASSESSMENT AND PLAN  1. Prostate cancer  "   2. Non-smoker      Orders Placed This Encounter   Procedures    NM PET/CT Skull Base to Mid Thigh     Standing Status:   Future     Standing Expiration Date:   5/17/2024     Order Specific Question:   Release to patient     Answer:   Routine Release     Order Specific Question:   What radiopharmaceutical is preferred for this exam?     Answer:   Pylarify/ Pilufolastat F-18 / PSMA (JAMIE, JOCELINE and PAD)     RECOMMENDATIONS:  Adan Diaz was diagnosed very high risk prostate cancer, at least stage IIIC (T3b NX MX) prostate adenocarcinoma with pretreatment PSA of 12.4 and Dori score 9 (4+5).  Patient has not initiated prostate cancer treatment.  Patient has undergone a pelvic MRI and bone scan only; pelvic MRI noted signal abnormality and abnormal enhancement extending into the base of the right seminal vesicle concerning for invasion of the right seminal vesicle base but without osseous abnormality or suspicious pelvic lymph nodes.  Equivocal findings were noted on bone scan.  Patient states being told that he is not an optimal surgical candidate due to age.    Indications and rationale as well as risks, benefits and alternatives of therapy for carcinoma of the prostate were discussed today in accordance with NCCN guidelines, advocating for definitive radiotherapy and long course ADT. Risks of radiation therapy includes but is not limited to radiation induced proctitis, cystitis, diarrhea, dysuria, frequency and bleeding from the bladder or rectum as well as a significant risk of permanent erectile dysfunction and progression of disease in spite of therapy with either local or systemic failure.  The option of definitive surgery has also been reviewed by the urologist as well as surveillance. We discussed the goals and plans of care with him and his family, answered all questions and he verbalizes understanding. I have seen, examined and reviewed this patient's medication list, appropriate labs and imaging studies  as well as other physician notes.    Given that his abdomen has not been imaged and to further investigate equivocal bone scan findings, I recommended completion staging with PSMA PET scan to which patient agreed.  This will be scheduled and patient will return for test results and final recommendations.    If no distant metastatic disease is found, a definitive course of fractionated radiation therapy is recommended to the prostate and regional lymphatics with long course ADT; anticipate a course of 7000 cGy over 28 treatment fractions. The patient verbalizes understanding, voices no further questions and wishes to proceed with recommended therapy.     Will refer back to urologist for initiation of ADT.  If staging work-up does not reveal distant metastatic disease, will refer patient back to urologist for fiducial seed placement and Space OAR gel placement to take place approximately 8 to 10 weeks following initiation of ADT.    Continue ongoing management per primary care physician and other specialists.    Thank you for allowing me to assist in his care.    There are no Patient Instructions on file for this visit.    Time Spent: I spent 56 minutes caring for Adan on this date of service. This time includes time spent by me in the following activities: preparing for the visit, reviewing tests, obtaining and/or reviewing a separately obtained history, performing a medically appropriate examination and/or evaluation, counseling and educating the patient/family/caregiver, ordering medications, tests, or procedures, referring and communicating with other health care professionals, documenting information in the medical record, independently interpreting results and communicating that information with the patient/family/caregiver, and care coordination.   Russel Smith MD  05/17/2023

## 2023-05-17 ENCOUNTER — CONSULT (OUTPATIENT)
Dept: RADIATION ONCOLOGY | Facility: HOSPITAL | Age: 83
End: 2023-05-17
Payer: MEDICARE

## 2023-05-17 VITALS
BODY MASS INDEX: 27.33 KG/M2 | SYSTOLIC BLOOD PRESSURE: 169 MMHG | WEIGHT: 190.9 LBS | HEIGHT: 70 IN | HEART RATE: 72 BPM | DIASTOLIC BLOOD PRESSURE: 82 MMHG | OXYGEN SATURATION: 98 %

## 2023-05-17 DIAGNOSIS — Z78.9 NON-SMOKER: ICD-10-CM

## 2023-05-17 DIAGNOSIS — C61 PROSTATE CANCER: Primary | ICD-10-CM

## 2023-05-17 PROCEDURE — G0463 HOSPITAL OUTPT CLINIC VISIT: HCPCS | Performed by: RADIOLOGY

## 2023-05-23 ENCOUNTER — TELEPHONE (OUTPATIENT)
Dept: ONCOLOGY | Facility: HOSPITAL | Age: 83
End: 2023-05-23
Payer: MEDICARE

## 2023-05-23 NOTE — TELEPHONE ENCOUNTER
Received order for eligard  Called pt to scheduled. Was informed from patient that Dr Edgar wants pt to wait until after his pet scan to begin these injections.    Pt states that he will call us back once lucien gives the ok to start.

## 2023-06-08 ENCOUNTER — HOSPITAL ENCOUNTER (OUTPATIENT)
Dept: CT IMAGING | Facility: HOSPITAL | Age: 83
Discharge: HOME | End: 2023-06-08
Payer: MEDICARE

## 2023-06-08 DIAGNOSIS — C61 PROSTATE CANCER: ICD-10-CM

## 2023-06-08 PROCEDURE — 78815 PET IMAGE W/CT SKULL-THIGH: CPT

## 2023-06-08 PROCEDURE — A9595 PIFLUFOLASTAT F 18 9 MCI SOLUTION PREFILLED SYRINGE: HCPCS

## 2023-06-08 PROCEDURE — 0 PIFLUFOLASTAT F 18 9 MCI SOLUTION PREFILLED SYRINGE

## 2023-06-08 RX ADMIN — PIFLUFOLASTAT F-18 1 DOSE: 80 INJECTION INTRAVENOUS at 14:36

## 2023-06-11 NOTE — PROGRESS NOTES
[facility]h Edgar MD      Philippe Haider, APRN  ____________________________________________________________  [facility]  Department of Radiation Oncology  [address] [place] [id number]  Office:  [phone]  Fax: [phone]    DATE: 06/12/2023  PATIENT: Adan Diaz  1940                         MEDICAL RECORD #: [id number]                                                       REASON FOR FOLLOW UP VISIT  Adan Diaz is a very pleasant 82 y.o. patient that returns today for further radiotherapy recommendations regarding prostate cancer. Reports frequency/urgncy with urination. Denies activity change, appetite change, expected weight change, nasuea/vomiitng, diarrhea, light-headedness, weakness, and headaches. He follows .      History of Present Illness:  Stage IIIC (T3b, N0, cM0) prostatic adenocarcinoma, pre-PSA 12.4, [place] 9 (4+5).      08/13/2020 - PSA: 3.520  02/02/2022 - PSA: 8.030  03/14/2022 - PSA: 6.580  07/18/2022 - PSA: 6.5  03/23/2023 - PSA: 12.4    04/06/2023 - Appointment with :  Worsening elevated PSA.  We again had a long discussion regarding the natural history of PSA including possible causes including prostate cancer, prostate enlargement, etc.  Patient would like to schedule prostate biopsy in office.    He will follow-up with me in my [place] office next week for transrectal ultrasound-guided prostate biopsy.    We discussed risks of the procedure including but not limited to infection, bleeding, need for additional procedures, possibility of findings to stop finding cancer.    He voiced understanding and provided informed consent to proceed.     04/13/2023 - Prostate biopsy per :  Left apex:  Acinar adenocarcinoma, grade group 3 (Dori score 4+3 = 7).  Involving 2 of 3 cores, 51 to 60% and 1 to 5% of prostatic tissue.  Percentage of pattern 4: 81 to 90%.  Left mid:  Acinar adenocarcinoma, grade group 3  (Dori score 4+3 = 7).  Involving 2 of 2 cores, 51 to 60% of prostatic tissue per core.  Percentage of pattern 4: 81 to 90%.  Left base:  Acinar adenocarcinoma, grade group 3 ([place] score 4+3 = 7).  Involving 2 of 2 cores, 41-50% of prostatic tissue per core.  Percentage of pattern 4: 71 to 80%.  Perineural invasion identified.  Right apex:  Acinar adenocarcinoma, grade group 3 ([place] score 4+3 = 7).  Involving 2 of 2 cores, 71 to 80% and 21 to 30% of prostatic tissue.  Percentage of pattern 4: Greater than 90%.  Right mid:  Acinar adenocarcinoma, grade group 5 (Dori score 4+5 = 9).  Involving 3 of 3 cores, greater than 90% of prostatic tissue per core.  Percentage of pattern 5: 1 to 5%.  Right base:  Acinar adenocarcinoma, grade group 4 ([place] score 4+4 = 8).  Involving 2 of 2 cores, 60 to 70% and 40 to 50% of prostatic tissue.  Perineural invasion identified.    04/20/2023 - Appointment with Dr. Berrios:  Given his high risk Dori 9 prostate cancer, I did not recommend active surveillance.  I do not recommend surgery given his advanced age. I did recommend consultation with radiation oncology to consider radiation after getting a prostate MRI and bone scan.  We discussed that if he does decide to go with radiation, he will follow-up with my partner Dr. Titus who does SpaceOAR and fiducial prostate markers.  He can then follow-up with me in my [place] clinic for PSA follow-ups after completing radiation    05/10/2023 - MRI Pelvis with and without contrast:  Infiltrative diffuse T2 hypointense masslike signal throughout the prostate extending from base to apex involving the transitional and peripheral zone. Findings are in keeping with biopsy-proven diffuse prostate neoplasm. Within this background of signal abnormality, there is a focal area of markedly increased diffusion restriction in the RIGHT lateral prostate peripheral zone at the mid gland, likely correlating with the site of biopsy-proven Dori  9 disease.  Signal abnormality in the prostate broadly abuts the prostate margin without direct extension beyond the prostate margin. Additionally, there is signal abnormality and abnormal enhancement extending into the base of the RIGHT seminal vesicle, concerning for invasion of the RIGHT seminal vesicle base.  No pelvic lymphadenopathy or suspicious pelvic bone lesion.    05/15/2023 - Bone Scan:  An area of somewhat asymmetric appearing uptake at the cervicothoracic junction on the posterior images. This is nonspecific. Consider follow-up MRI of the cervical spine that should also include T1 and T2.  Mild uptake within the remainder of the thoracal lumbar spine is likely degenerative. Mild degree of joint uptake elsewhere.    05/17/2023 - Consult with  (Covering for ):  Given that his abdomen has not been imaged and to further investigate equivocal bone scan findings, I recommended completion staging with PSMA PET scan to which patient agreed.  This will be scheduled and patient will return for test results and final recommendations.  If no distant metastatic disease is found, a definitive course of fractionated radiation therapy is recommended to the prostate and regional lymphatics with long course ADT; anticipate a course of 7000 cGy over 28 treatment fractions. The patient verbalizes understanding, voices no further questions and wishes to proceed with recommended therapy.   Will refer back to urologist for initiation of ADT.  If staging work-up does not reveal distant metastatic disease, will refer patient back to urologist for fiducial seed placement and Space OAR gel placement to take place approximately 8 to 10 weeks following initiation of ADT.  Continue ongoing management per primary care physician and other specialists.    06/08/2023 - PET scan:  Abnormal PSMA PET/CT with hypermetabolism confined to the prostate gland and no convincing evidence of regional pelvic disease or distant  metastases.    History obtained from  PATIENT, FAMILY, and CHART    PAST MEDICAL HISTORY  Past Medical History:   Diagnosis Date    Hypertension     Prostate cancer       PAST SURGICAL HISTORY  Past Surgical History:   Procedure Laterality Date    HERNIA REPAIR  1979    KNEE SURGERY Left     PROSTATE BIOPSY  04/13/2023    Dr. Berrios      FAMILY HISTORY  family history includes Colon cancer in his father.     SOCIAL HISTORY  Social History     Tobacco Use    Smoking status: Never    Smokeless tobacco: Never   Vaping Use    Vaping Use: Never used   Substance Use Topics    Alcohol use: Never    Drug use: Never     ALLERGIES  Patient has no known allergies.     MEDICATIONS    Current Outpatient Medications:     amLODIPine (NORVASC) 5 MG tablet, TAKE 1 TABLET BY MOUTH EVERY DAY FOR 30 DAYS, Disp: , Rfl:     lisinopril-hydrochlorothiazide (PRINZIDE,ZESTORETIC) 20-25 MG per tablet, TAKE 1 TABLET BY MOUTH EVERY DAY FOR 90 DAYS, Disp: , Rfl:     Current outpatient and discharge medications have been reconciled for the patient.  Reviewed by: Mohan Edgar III, MD    The following portions of the patient's history were reviewed and updated as appropriate: allergies, current medications, past family history, past medical history, past social history, past surgical history and problem list.    REVIEW OF SYSTEMS  Review of Systems   Constitutional: Negative.  Negative for activity change and fatigue.   HENT: Negative.     Respiratory: Negative.  Negative for cough and shortness of breath.    Cardiovascular: Negative.  Negative for chest pain, palpitations and leg swelling.   Gastrointestinal: Negative.  Negative for abdominal pain, constipation and diarrhea.   Genitourinary:  Positive for frequency and urgency. Negative for dysuria and hematuria.   Musculoskeletal: Negative.    Skin: Negative.    Neurological: Negative.  Negative for dizziness, weakness and headaches.   Hematological: Negative.  Negative for adenopathy.  "  Psychiatric/Behavioral: Negative.     All other systems reviewed and are negative.    PHYSICAL EXAM  VITAL SIGNS:   Vitals:    06/12/23 1001   BP: 161/73   Pulse: 70   SpO2: 96%  Comment: room air   Weight: 85.6 kg (188 lb 12.8 oz)   Height: 177.8 cm (70\")   PainSc: 0-No pain      Physical Exam  Vitals reviewed.   Constitutional:       Appearance: Normal appearance.   HENT:      Head: Normocephalic.   Cardiovascular:      Rate and Rhythm: Normal rate and regular rhythm.      Pulses: Normal pulses.      Heart sounds: Normal heart sounds.   Pulmonary:      Effort: Pulmonary effort is normal.      Breath sounds: Normal breath sounds.   Abdominal:      General: Bowel sounds are normal.   Musculoskeletal:         General: Normal range of motion.      Cervical back: Normal range of motion and neck supple.   Skin:     General: Skin is warm.      Capillary Refill: Capillary refill takes less than 2 seconds.   Neurological:      General: No focal deficit present.      Mental Status: He is alert and oriented to person, place, and time.   Psychiatric:         Mood and Affect: Mood normal.         Behavior: Behavior normal.        Performance Status: ECOG (0) Fully active, able to carry on all predisease performance without restriction    Clinical Quality Measures  -Pain Documented by Standardized Tool, FPS Adan Diaz reports a pain score of 0. Given his pain assessment as noted, treatment options were discussed and the following options were decided upon as a follow-up plan to address the patient's pain:  No pain, no plan given .     -Advanced Care Planning Advance Care Planning  ACP discussion was held with the patient during this visit. Patient does not have an advance directive, information provided.    -Body Mass Index Screening and Follow-Up Plan BMI is >= 25 and <30. (Overweight) The following options were offered after discussion;: none (medical contraindication)    -Tobacco Use: Screening and Cessation " Intervention Social History    Tobacco Use      Smoking status: Never      Smokeless tobacco: Never     ASSESSMENT AND PLAN  1. Prostate cancer    2. Non-smoker      Orders Placed This Encounter   Procedures    PSA Diagnostic     Standing Status:   Future     Number of Occurrences:   1     Standing Expiration Date:   6/12/2024     Order Specific Question:   Release to patient     Answer:   Routine Release    Ambulatory Referral to Urology     Referral Priority:   Routine     Referral Type:   Consultation     Referral Reason:   Specialty Services Required     Requested Specialty:   Urology     Number of Visits Requested:   1     RECOMMENDATIONS: Adan Diaz returns today for further radiotherapy recommendations regarding Stage IIIC (T3b, N0, cM0) prostate adenocarcinoma, pre-PSA 12.4, [place] score 9 (4+5). PET scan completed on 06/08/2023 revealed an abnormal PSMA PET/CT with hypermetabolism confined to the prostate gland and no convincing evidence of regional pelvic disease or distant metastases.    I anticipate a course of 7000 cGy over 28 treatment fractions. The patient verbalizes understanding, voices no further questions and wishes to proceed with recommended therapy. Will refer patient back to urologist for ADT management, fiducial seed and Space OAR gel placement. He will return after seed/gel placement in early August for CT simulation.     Patient Instructions   1) PSA today  2) Plan on 28 daily treatments.  3) Will refer back to urology for lupron therapy, seed/gel placement.  4) return early August for markings.  Todays appointment time was spent in counseling, coordination of care and surveillance related to patients diagnosis as well as radiation therapy possible and probable after effects.     Mohan Edgar III, MD  06/12/2023

## 2023-06-12 ENCOUNTER — OFFICE VISIT (OUTPATIENT)
Dept: RADIATION ONCOLOGY | Facility: HOSPITAL | Age: 83
End: 2023-06-12
Payer: MEDICARE

## 2023-06-12 ENCOUNTER — PREP FOR SURGERY (OUTPATIENT)
Dept: UROLOGY | Facility: CLINIC | Age: 83
End: 2023-06-12
Payer: MEDICARE

## 2023-06-12 ENCOUNTER — LAB (OUTPATIENT)
Dept: LAB | Facility: HOSPITAL | Age: 83
End: 2023-06-12
Payer: MEDICARE

## 2023-06-12 VITALS
BODY MASS INDEX: 27.03 KG/M2 | HEIGHT: 70 IN | SYSTOLIC BLOOD PRESSURE: 161 MMHG | DIASTOLIC BLOOD PRESSURE: 73 MMHG | WEIGHT: 188.8 LBS | OXYGEN SATURATION: 96 % | HEART RATE: 70 BPM

## 2023-06-12 DIAGNOSIS — C61 MALIGNANT NEOPLASM OF PROSTATE: Primary | ICD-10-CM

## 2023-06-12 DIAGNOSIS — C61 PROSTATE CANCER: Primary | ICD-10-CM

## 2023-06-12 DIAGNOSIS — C61 PROSTATE CANCER: ICD-10-CM

## 2023-06-12 DIAGNOSIS — Z78.9 NON-SMOKER: ICD-10-CM

## 2023-06-12 LAB — PSA SERPL-MCNC: 13.1 NG/ML (ref 0–4)

## 2023-06-12 PROCEDURE — 84153 ASSAY OF PSA TOTAL: CPT

## 2023-06-12 PROCEDURE — 36415 COLL VENOUS BLD VENIPUNCTURE: CPT

## 2023-06-12 NOTE — PATIENT INSTRUCTIONS
1) PSA today  2) Plan on 28 daily treatments.  3) Will refer back to urology for lupron therapy, seed/gel placement.  4) return early August for markings.

## 2023-06-13 ENCOUNTER — PREP FOR SURGERY (OUTPATIENT)
Dept: UROLOGY | Facility: CLINIC | Age: 83
End: 2023-06-13
Payer: MEDICARE

## 2023-06-14 ENCOUNTER — INFUSION (OUTPATIENT)
Dept: ONCOLOGY | Facility: HOSPITAL | Age: 83
End: 2023-06-14
Payer: MEDICARE

## 2023-06-14 VITALS
BODY MASS INDEX: 26.94 KG/M2 | RESPIRATION RATE: 14 BRPM | HEART RATE: 58 BPM | WEIGHT: 188.2 LBS | HEIGHT: 70 IN | TEMPERATURE: 97.2 F | OXYGEN SATURATION: 96 % | SYSTOLIC BLOOD PRESSURE: 159 MMHG | DIASTOLIC BLOOD PRESSURE: 71 MMHG

## 2023-06-14 DIAGNOSIS — C61 PROSTATE CANCER: Primary | ICD-10-CM

## 2023-06-14 PROCEDURE — 96402 CHEMO HORMON ANTINEOPL SQ/IM: CPT

## 2023-06-14 PROCEDURE — 25010000002 LEUPROLIDE 22.5 MG KIT: Performed by: UROLOGY

## 2023-06-14 RX ADMIN — LEUPROLIDE ACETATE 22.5 MG: KIT SUBCUTANEOUS at 08:27

## 2023-08-09 ENCOUNTER — PRE-ADMISSION TESTING (OUTPATIENT)
Dept: PREADMISSION TESTING | Facility: HOSPITAL | Age: 83
End: 2023-08-09
Payer: MEDICARE

## 2023-08-09 VITALS
SYSTOLIC BLOOD PRESSURE: 183 MMHG | BODY MASS INDEX: 29.52 KG/M2 | OXYGEN SATURATION: 93 % | DIASTOLIC BLOOD PRESSURE: 96 MMHG | HEIGHT: 67 IN | RESPIRATION RATE: 18 BRPM | WEIGHT: 188.05 LBS | HEART RATE: 80 BPM

## 2023-08-09 LAB
DEPRECATED RDW RBC AUTO: 43.2 FL (ref 37–54)
ERYTHROCYTE [DISTWIDTH] IN BLOOD BY AUTOMATED COUNT: 13.3 % (ref 12.3–15.4)
HCT VFR BLD AUTO: 41.7 % (ref 37.5–51)
HGB BLD-MCNC: 13.5 G/DL (ref 13–17.7)
MCH RBC QN AUTO: 28.5 PG (ref 26.6–33)
MCHC RBC AUTO-ENTMCNC: 32.4 G/DL (ref 31.5–35.7)
MCV RBC AUTO: 88.2 FL (ref 79–97)
PLATELET # BLD AUTO: 256 10*3/MM3 (ref 140–450)
PMV BLD AUTO: 9.2 FL (ref 6–12)
RBC # BLD AUTO: 4.73 10*6/MM3 (ref 4.14–5.8)
WBC NRBC COR # BLD: 6.41 10*3/MM3 (ref 3.4–10.8)

## 2023-08-09 PROCEDURE — 36415 COLL VENOUS BLD VENIPUNCTURE: CPT

## 2023-08-09 PROCEDURE — 85027 COMPLETE CBC AUTOMATED: CPT

## 2023-08-09 RX ORDER — LISINOPRIL 20 MG/1
20 TABLET ORAL DAILY
COMMUNITY
Start: 2023-06-05

## 2023-08-09 NOTE — DISCHARGE INSTRUCTIONS
Before you come to the hospital        Arrival time: AS DIRECTED BY OFFICE     YOU MAY TAKE THE FOLLOWING MEDICATION(S) THE MORNING OF SURGERY WITH A SIP OF WATER: TAKE MEDICATIONS AS DIRECTED PER YOUR MD             **DO NOT TAKE YOUR LISINOPRIL WITHIN 24 HOURS OF YOUR SURGERY**             ALL OTHER HOME MEDICATION CHECK WITH YOUR PHYSICIAN (especially if you are taking diabetes medicines or blood thinners)    Do not take any Erectile Dysfunction medications (EX: CIALIS, VIAGRA) 24 hours prior to surgery.      If you were given and instructed to use a germ- killing soap, use as directed the night before surgery and again the morning of surgery or as directed by your surgeon. (Use one-half of the bottle with each shower.)   See attached information for How to Use Chlorhexidine for Bathing if applicable.            Eating and drinking restrictions prior to scheduled arrival time    2 Hours before arrival time STOP   Drinking Clear liquids (water, black coffee-NO CREAM,  apple juice-no pulp)      8 Hours before arrival time STOP   All food, full liquids, and dairy products    (It is extremely important that you follow these guidelines to prevent delay or cancelation of your procedure)     Clear Liquids  Water and flavored water                                                                      Clear Fruit juices, such as cranberry juice and apple juice.  Black coffee (NO cream of any kind, including powdered).  Plain tea  Clear bouillon or broth.  Flavored gelatin.  Soda.  Gatorade or Powerade.  Full liquid examples  Juices that have pulp.  Frozen ice pops that contain fruit pieces.  Coffee with creamer  Milk.  Yogurt.                MANAGING PAIN AFTER SURGERY    We know you are probably wondering what your pain will be like after surgery.  Following surgery it is unrealistic to expect you will not have pain.   Pain is how our bodies let us know that something is wrong or cautions us to be careful.  That said, our  goal is to make your pain tolerable.    Methods we may use to treat your pain include (oral or IV medications, PCAs, epidurals, nerve blocks, etc.)   While some procedures require IV pain medications for a short time after surgery, transitioning to pain medications by mouth allows for better management of pain.   Your nurse will encourage you to take oral pain medications whenever possible.  IV medications work almost immediately, but only last a short while.  Taking medications by mouth allows for a more constant level of medication in your blood stream for a longer period of time.      Once your pain is out of control it is harder to get back under control.  It is important you are aware when your next dose of pain medication is due.  If you are admitted, your nurse may write the time of your next dose on the white board in your room to help you remember.      We are interested in your pain and encourage you to inform us about aggravating factors during your visit.   Many times a simple repositioning every few hours can make a big difference.    If your physician says it is okay, do not let your pain prevent you from getting out of bed. Be sure to call your nurse for assistance prior to getting up so you do not fall.      Before surgery, please decide your tolerable pain goal.  These faces help describe the pain ratings we use on a 0-10 scale.   Be prepared to tell us your goal and whether or not you take pain or anxiety medications at home.          Preparing for Surgery  Preparing for surgery is an important part of your care. It can make things go more smoothly and help you avoid complications. The steps leading up to surgery may vary among hospitals. Follow all instructions given to you by your health care providers. Ask questions if you do not understand something. Talk about any concerns that you have.  Here are some questions to consider asking before your surgery:  If my surgery is not an emergency (is  elective), when would be the best time to have the surgery?  What arrangements do I need to make for work, home, or school?  What will my recovery be like? How long will it be before I can return to normal activities?  Will I need to prepare my home? Will I need to arrange care for me or my children?  Should I expect to have pain after surgery? What are my pain management options? Are there nonmedical options that I can try for pain?  Tell a health care provider about:  Any allergies you have.  All medicines you are taking, including vitamins, herbs, eye drops, creams, and over-the-counter medicines.  Any problems you or family members have had with anesthetic medicines.  Any blood disorders you have.  Any surgeries you have had.  Any medical conditions you have.  Whether you are pregnant or may be pregnant.  What are the risks?  The risks and complications of surgery depend on the specific procedure that you have. Discuss all the risks with your health care providers before your surgery. Ask about common surgical complications, which may include:  Infection.  Bleeding or a need for blood replacement (transfusion).  Allergic reactions to medicines.  Damage to surrounding nerves, tissues, or structures.  A blood clot.  Scarring.  Failure of the surgery to correct the problem.  Follow these instructions before the procedure:  Several days or weeks before your procedure  You may have a physical exam by your primary health care provider to make sure it is safe for you to have surgery.  You may have testing. This may include a chest X-ray, blood and urine tests, electrocardiogram (ECG), or other testing.  Ask your health care provider about:  Changing or stopping your regular medicines. This is especially important if you are taking diabetes medicines or blood thinners.  Taking medicines such as aspirin and ibuprofen. These medicines can thin your blood. Do not take these medicines unless your health care provider tells  you to take them.  Taking over-the-counter medicines, vitamins, herbs, and supplements.  Do not use any products that contain nicotine or tobacco, such as cigarettes and e-cigarettes. If you need help quitting, ask your health care provider.  Avoid alcohol.  Ask your health care provider if there are exercises you can do to prepare for surgery.  Eat a healthy diet.   Plan to have someone 18 years of age or older to take you home from the hospital. We will need to verify your ride on the morning of surgery if you are being discharged home on the same day. Tell your ride to be expecting a call from the hospital prior to your procedure.   Plan to have a responsible adult care for you for at least 24 hours after you leave the hospital or clinic. This is important.  The day before your procedure  You may be given antibiotic medicine to take by mouth to help prevent infection. Take it as told by your health care provider.  You may be asked to shower with a germ-killing soap.  Follow instructions from your health care provider about eating and drinking restrictions. This includes gum, mints and hard candy.  Pack comfortable clothes according to your procedure.   The day of your procedure  You may need to take another shower with a germ-killing soap before you leave home in the morning.  With a small sip of water, take only the medicines that you are told to take.  Remove all jewelry including rings.   Leave anything you consider valuable at home except hearing aids if needed.  You do not need to bring your home medications into the hospital.   Do not wear any makeup, nail polish, powder, deodorant, lotion, hair accessories, or anything on your skin or body except your clothes.  If you will be staying in the hospital, bring a case to hold your glasses, contacts, or dentures. You may also want to bring your robe and non-skid footwear.       (Do not use denture adhesives since you will be asked to remove them during   surgery).   If you wear oxygen at home, bring it with you the day of surgery.  If instructed by your health care provider, bring your sleep apnea device with you on the day of your surgery (if this applies to you).  You may want to leave your suitcase and sleep apnea device in the car until after surgery.   Arrive at the hospital as scheduled.  Bring a friend or family member with you who can help to answer questions and be present while you meet with your health care provider.  At the hospital  When you arrive at the hospital:  Go to registration located at the main entrance of the hospital. You will be registered and given a beeper and a sticker sheet. Take the stickers to the Outpatient nurses desk and place in the black tray. This is to notify staff that you have arrived. Then return to the lobby to wait.   When your beeper lights up and vibrates proceed through the double doors, under the stairs, and a member of the Outpatient Surgery staff will escort you to your preoperative room.  You may have to wear compression sleeves. These help to prevent blood clots and reduce swelling in your legs.  An IV may be inserted into one of your veins.              In the operating room, you may be given one or more of the following:        A medicine to help you relax (sedative).        A medicine to numb the area (local anesthetic).        A medicine to make you fall asleep (general anesthetic).        A medicine that is injected into an area of your body to numb everything below the                      injection site (regional anesthetic).  You may be given an antibiotic through your IV to help prevent infection.  Your surgical site will be marked or identified.    Contact a health care provider if you:  Develop a fever of more than 100.4øF (38øC) or other feelings of illness during the 48 hours before your surgery.  Have symptoms that get worse.  Have questions or concerns about your surgery.  Summary  Preparing for  surgery can make the procedure go more smoothly and lower your risk of complications.  Before surgery, make a list of questions and concerns to discuss with your surgeon. Ask about the risks and possible complications.  In the days or weeks before your surgery, follow all instructions from your health care provider. You may need to stop smoking, avoid alcohol, follow eating restrictions, and change or stop your regular medicines.  Contact your surgeon if you develop a fever or other signs of illness during the few days before your surgery.  This information is not intended to replace advice given to you by your health care provider. Make sure you discuss any questions you have with your health care provider.  Document Revised: 12/21/2018 Document Reviewed: 10/23/2018  [employer] Patient Education c 2021 [employer]

## 2023-08-10 ENCOUNTER — TELEPHONE (OUTPATIENT)
Dept: UROLOGY | Facility: CLINIC | Age: 83
End: 2023-08-10
Payer: MEDICARE

## 2023-08-10 ENCOUNTER — TRANSCRIBE ORDERS (OUTPATIENT)
Dept: ADMINISTRATIVE | Facility: HOSPITAL | Age: 83
End: 2023-08-10
Payer: MEDICARE

## 2023-08-10 DIAGNOSIS — M54.2 NECK PAIN: Primary | ICD-10-CM

## 2023-08-10 DIAGNOSIS — M25.511 RIGHT SHOULDER PAIN, UNSPECIFIED CHRONICITY: Primary | ICD-10-CM

## 2023-08-10 NOTE — TELEPHONE ENCOUNTER
Called pt. To remind him to complete a fleets enema prior to procedure on 8/16. Instructions given to complete enema 1-2 hrs before leaving home or to do the enema about 9 pm the night before surgery. Pt. Verbalizes understanding.

## 2023-08-11 ENCOUNTER — HOSPITAL ENCOUNTER (OUTPATIENT)
Dept: GENERAL RADIOLOGY | Facility: HOSPITAL | Age: 83
Discharge: HOME | End: 2023-08-11
Admitting: NURSE PRACTITIONER
Payer: MEDICARE

## 2023-08-11 DIAGNOSIS — M54.2 NECK PAIN: ICD-10-CM

## 2023-08-11 DIAGNOSIS — M25.511 RIGHT SHOULDER PAIN, UNSPECIFIED CHRONICITY: ICD-10-CM

## 2023-08-11 PROCEDURE — 72040 X-RAY EXAM NECK SPINE 2-3 VW: CPT

## 2023-08-11 PROCEDURE — 73030 X-RAY EXAM OF SHOULDER: CPT

## 2023-08-15 ENCOUNTER — TELEPHONE (OUTPATIENT)
Dept: UROLOGY | Facility: CLINIC | Age: 83
End: 2023-08-15
Payer: MEDICARE

## 2023-08-15 NOTE — TELEPHONE ENCOUNTER
Called patient to remind them to arrive at patient registration on 8/16 at 5AM for the procedure with Dr. Titus. Spoke with patient. Told patient if they had any questions to please contact our office at [phone].

## 2023-08-16 ENCOUNTER — ANESTHESIA EVENT (OUTPATIENT)
Dept: PERIOP | Facility: HOSPITAL | Age: 83
End: 2023-08-16
Payer: MEDICARE

## 2023-08-16 ENCOUNTER — ANESTHESIA (OUTPATIENT)
Dept: PERIOP | Facility: HOSPITAL | Age: 83
End: 2023-08-16
Payer: MEDICARE

## 2023-08-16 ENCOUNTER — HOSPITAL ENCOUNTER (OUTPATIENT)
Facility: HOSPITAL | Age: 83
Setting detail: HOSPITAL OUTPATIENT SURGERY
Discharge: HOME | End: 2023-08-16
Attending: UROLOGY | Admitting: UROLOGY
Payer: MEDICARE

## 2023-08-16 VITALS
SYSTOLIC BLOOD PRESSURE: 152 MMHG | HEART RATE: 58 BPM | TEMPERATURE: 97.8 F | RESPIRATION RATE: 16 BRPM | OXYGEN SATURATION: 92 % | DIASTOLIC BLOOD PRESSURE: 102 MMHG

## 2023-08-16 DIAGNOSIS — C61 MALIGNANT NEOPLASM OF PROSTATE: ICD-10-CM

## 2023-08-16 PROCEDURE — 55876 PLACE RT DEVICE/MARKER PROS: CPT | Performed by: UROLOGY

## 2023-08-16 PROCEDURE — 25010000002 DEXAMETHASONE PER 1 MG: Performed by: NURSE ANESTHETIST, CERTIFIED REGISTERED

## 2023-08-16 PROCEDURE — 25010000002 ONDANSETRON PER 1 MG: Performed by: NURSE ANESTHETIST, CERTIFIED REGISTERED

## 2023-08-16 PROCEDURE — S0260 H&P FOR SURGERY: HCPCS | Performed by: UROLOGY

## 2023-08-16 PROCEDURE — 25010000002 CEFAZOLIN PER 500 MG: Performed by: UROLOGY

## 2023-08-16 PROCEDURE — 55874 TPRNL PLMT BIODEGRDABL MATRL: CPT | Performed by: UROLOGY

## 2023-08-16 PROCEDURE — 25010000002 PROPOFOL 10 MG/ML EMULSION: Performed by: NURSE ANESTHETIST, CERTIFIED REGISTERED

## 2023-08-16 PROCEDURE — A4648 IMPLANTABLE TISSUE MARKER: HCPCS | Performed by: UROLOGY

## 2023-08-16 PROCEDURE — C1889 IMPLANT/INSERT DEVICE, NOC: HCPCS | Performed by: UROLOGY

## 2023-08-16 DEVICE — IMPLANTABLE DEVICE
Type: IMPLANTABLE DEVICE | Site: PROSTATE | Status: FUNCTIONAL
Brand: GOLD FIDUCIAL

## 2023-08-16 RX ORDER — DROPERIDOL 2.5 MG/ML
0.62 INJECTION, SOLUTION INTRAMUSCULAR; INTRAVENOUS ONCE AS NEEDED
Status: DISCONTINUED | OUTPATIENT
Start: 2023-08-16 | End: 2023-08-16 | Stop reason: HOSPADM

## 2023-08-16 RX ORDER — SODIUM CHLORIDE 0.9 % (FLUSH) 0.9 %
3 SYRINGE (ML) INJECTION EVERY 12 HOURS SCHEDULED
Status: DISCONTINUED | OUTPATIENT
Start: 2023-08-16 | End: 2023-08-16 | Stop reason: HOSPADM

## 2023-08-16 RX ORDER — HYDROCODONE BITARTRATE AND ACETAMINOPHEN 10; 325 MG/1; MG/1
1 TABLET ORAL EVERY 4 HOURS PRN
Status: DISCONTINUED | OUTPATIENT
Start: 2023-08-16 | End: 2023-08-16 | Stop reason: HOSPADM

## 2023-08-16 RX ORDER — LABETALOL HYDROCHLORIDE 5 MG/ML
5 INJECTION, SOLUTION INTRAVENOUS
Status: DISCONTINUED | OUTPATIENT
Start: 2023-08-16 | End: 2023-08-16 | Stop reason: HOSPADM

## 2023-08-16 RX ORDER — FLUMAZENIL 0.1 MG/ML
0.2 INJECTION INTRAVENOUS AS NEEDED
Status: DISCONTINUED | OUTPATIENT
Start: 2023-08-16 | End: 2023-08-16 | Stop reason: HOSPADM

## 2023-08-16 RX ORDER — SODIUM CHLORIDE 0.9 % (FLUSH) 0.9 %
3-10 SYRINGE (ML) INJECTION AS NEEDED
Status: DISCONTINUED | OUTPATIENT
Start: 2023-08-16 | End: 2023-08-16 | Stop reason: HOSPADM

## 2023-08-16 RX ORDER — FENTANYL CITRATE 50 UG/ML
25 INJECTION, SOLUTION INTRAMUSCULAR; INTRAVENOUS
Status: DISCONTINUED | OUTPATIENT
Start: 2023-08-16 | End: 2023-08-16 | Stop reason: HOSPADM

## 2023-08-16 RX ORDER — DEXAMETHASONE SODIUM PHOSPHATE 4 MG/ML
INJECTION, SOLUTION INTRA-ARTICULAR; INTRALESIONAL; INTRAMUSCULAR; INTRAVENOUS; SOFT TISSUE AS NEEDED
Status: DISCONTINUED | OUTPATIENT
Start: 2023-08-16 | End: 2023-08-16 | Stop reason: SURG

## 2023-08-16 RX ORDER — PROPOFOL 10 MG/ML
VIAL (ML) INTRAVENOUS AS NEEDED
Status: DISCONTINUED | OUTPATIENT
Start: 2023-08-16 | End: 2023-08-16 | Stop reason: SURG

## 2023-08-16 RX ORDER — SODIUM CHLORIDE 9 MG/ML
40 INJECTION, SOLUTION INTRAVENOUS AS NEEDED
Status: DISCONTINUED | OUTPATIENT
Start: 2023-08-16 | End: 2023-08-16 | Stop reason: HOSPADM

## 2023-08-16 RX ORDER — ONDANSETRON 2 MG/ML
4 INJECTION INTRAMUSCULAR; INTRAVENOUS ONCE AS NEEDED
Status: DISCONTINUED | OUTPATIENT
Start: 2023-08-16 | End: 2023-08-16 | Stop reason: HOSPADM

## 2023-08-16 RX ORDER — HYDROCODONE BITARTRATE AND ACETAMINOPHEN 5; 325 MG/1; MG/1
1 TABLET ORAL ONCE AS NEEDED
Status: DISCONTINUED | OUTPATIENT
Start: 2023-08-16 | End: 2023-08-16 | Stop reason: HOSPADM

## 2023-08-16 RX ORDER — HYDROCODONE BITARTRATE AND ACETAMINOPHEN 5; 325 MG/1; MG/1
1 TABLET ORAL EVERY 8 HOURS PRN
Qty: 6 TABLET | Refills: 0 | Status: SHIPPED | OUTPATIENT
Start: 2023-08-16

## 2023-08-16 RX ORDER — LIDOCAINE HYDROCHLORIDE 20 MG/ML
INJECTION, SOLUTION EPIDURAL; INFILTRATION; INTRACAUDAL; PERINEURAL AS NEEDED
Status: DISCONTINUED | OUTPATIENT
Start: 2023-08-16 | End: 2023-08-16 | Stop reason: SURG

## 2023-08-16 RX ORDER — LIDOCAINE HYDROCHLORIDE 10 MG/ML
0.5 INJECTION, SOLUTION EPIDURAL; INFILTRATION; INTRACAUDAL; PERINEURAL ONCE AS NEEDED
Status: DISCONTINUED | OUTPATIENT
Start: 2023-08-16 | End: 2023-08-16 | Stop reason: HOSPADM

## 2023-08-16 RX ORDER — SODIUM CHLORIDE, SODIUM LACTATE, POTASSIUM CHLORIDE, CALCIUM CHLORIDE 600; 310; 30; 20 MG/100ML; MG/100ML; MG/100ML; MG/100ML
1000 INJECTION, SOLUTION INTRAVENOUS CONTINUOUS
Status: DISCONTINUED | OUTPATIENT
Start: 2023-08-16 | End: 2023-08-16 | Stop reason: HOSPADM

## 2023-08-16 RX ORDER — NALOXONE HCL 0.4 MG/ML
0.4 VIAL (ML) INJECTION AS NEEDED
Status: DISCONTINUED | OUTPATIENT
Start: 2023-08-16 | End: 2023-08-16 | Stop reason: HOSPADM

## 2023-08-16 RX ORDER — SODIUM CHLORIDE 0.9 % (FLUSH) 0.9 %
3 SYRINGE (ML) INJECTION AS NEEDED
Status: DISCONTINUED | OUTPATIENT
Start: 2023-08-16 | End: 2023-08-16 | Stop reason: HOSPADM

## 2023-08-16 RX ORDER — ONDANSETRON 2 MG/ML
INJECTION INTRAMUSCULAR; INTRAVENOUS AS NEEDED
Status: DISCONTINUED | OUTPATIENT
Start: 2023-08-16 | End: 2023-08-16 | Stop reason: SURG

## 2023-08-16 RX ORDER — SODIUM CHLORIDE, SODIUM LACTATE, POTASSIUM CHLORIDE, CALCIUM CHLORIDE 600; 310; 30; 20 MG/100ML; MG/100ML; MG/100ML; MG/100ML
100 INJECTION, SOLUTION INTRAVENOUS CONTINUOUS
Status: DISCONTINUED | OUTPATIENT
Start: 2023-08-16 | End: 2023-08-16 | Stop reason: HOSPADM

## 2023-08-16 RX ADMIN — SODIUM CHLORIDE, POTASSIUM CHLORIDE, SODIUM LACTATE AND CALCIUM CHLORIDE 1000 ML: 600; 310; 30; 20 INJECTION, SOLUTION INTRAVENOUS at 05:59

## 2023-08-16 RX ADMIN — DEXAMETHASONE SODIUM PHOSPHATE 8 MG: 4 INJECTION, SOLUTION INTRA-ARTICULAR; INTRALESIONAL; INTRAMUSCULAR; INTRAVENOUS; SOFT TISSUE at 07:25

## 2023-08-16 RX ADMIN — ONDANSETRON 4 MG: 2 INJECTION INTRAMUSCULAR; INTRAVENOUS at 07:25

## 2023-08-16 RX ADMIN — CEFAZOLIN 2 G: 2 INJECTION, POWDER, FOR SOLUTION INTRAMUSCULAR; INTRAVENOUS at 07:21

## 2023-08-16 RX ADMIN — PROPOFOL INJECTABLE EMULSION 160 MG: 10 INJECTION, EMULSION INTRAVENOUS at 07:13

## 2023-08-16 RX ADMIN — LIDOCAINE HYDROCHLORIDE 100 MG: 20 INJECTION, SOLUTION EPIDURAL; INFILTRATION; INTRACAUDAL; PERINEURAL at 07:13

## 2023-08-16 NOTE — ANESTHESIA PROCEDURE NOTES
Airway  Urgency: elective    Date/Time: 8/16/2023 7:13 AM  Airway not difficult    General Information and Staff    Patient location during procedure: OR  CRNA/CAA: Leno Ty CRNA    Indications and Patient Condition  Indications for airway management: airway protection    Preoxygenated: yes  Mask difficulty assessment: 1 - vent by mask    Final Airway Details  Final airway type: supraglottic airway      Successful airway: I-gel  Size 5     Cormack-Lehane Classification: grade IIa - partial view of glottis  Number of attempts at approach: 1  Assessment: lips, teeth, and gum same as pre-op    Additional Comments  Atraumatic

## 2023-08-16 NOTE — ANESTHESIA PREPROCEDURE EVALUATION
Anesthesia Evaluation     Patient summary reviewed   no history of anesthetic complications:   NPO Solid Status: > 8 hours  NPO Liquid Status: > 8 hours           Airway   Mallampati: II  TM distance: >3 FB  Neck ROM: full  Dental    (+) partials    Pulmonary    (-) asthma, sleep apnea, not a smoker  Cardiovascular   Exercise tolerance: good (4-7 METS)    (+) hypertension      Neuro/Psych  (-) seizures, TIA, CVA  GI/Hepatic/Renal/Endo    (-) liver disease, no renal disease, diabetes    Musculoskeletal     Abdominal    Substance History      OB/GYN          Other      history of cancer (prostate)                  Anesthesia Plan    ASA 2     general     intravenous induction     Anesthetic plan, risks, benefits, and alternatives have been provided, discussed and informed consent has been obtained with: patient.    CODE STATUS:

## 2023-08-16 NOTE — BRIEF OP NOTE
PERIRECTAL SPACER APPLICATION FOR RADIATION TREATMENT  Progress Note    Adan Diaz  8/16/2023    Pre-op Diagnosis:   Malignant neoplasm of prostate [C61]       Post-Op Diagnosis Codes:     * Malignant neoplasm of prostate [C61]    Procedure/CPTr Codes:        Procedure(s):  TRANSRECTAL ULTRASOUND GUIDED PLACEMENT PERIRECTAL SPACER APPLICATION FOR RADIATION TREATMENT AND PROSTATE FIDUCIARY MARKERS              Surgeon(s):  Gopal Titus MD    Anesthesia: General    Staff:   Circulator: Ester Kelly RN  Scrub Person: Farrukh Guadarrama Maya         Estimated Blood Loss: none    Urine Voided: * No values recorded between 8/16/2023  7:09 AM and 8/16/2023  7:48 AM *    Specimens:                None          Drains: * No LDAs found *    Findings: Adequate placement of spacer        Complications: None          Gopal Titus MD     Date: 8/16/2023  Time: 08:01 CDT

## 2023-08-16 NOTE — H&P
Urology H&P    Mr. Diaz is 83 y.o. male    CHIEF COMPLAINT: Patient is here for SpaceOAR and fiduciary markers  HPI  Patient with adenocarcinoma of the prostate to undergo EBRT.  He is here today for PERIRECTAL SPACE GELL APPLICATION and fiduciary markers.    The following portions of the patient's history were reviewed and updated as appropriate: allergies, current medications, past family history, past medical history, past social history, past surgical history and problem list.    Constitutional: Negative for chills and fever.   Gastrointestinal: Negative for abdominal pain, anal bleeding and blood in stool.   Genitourinary: Negative for flank pain and hematuria.      Medications Prior to Admission   Medication Sig Dispense Refill Last Dose    amLODIPine (NORVASC) 5 MG tablet TAKE 1 TABLET BY MOUTH EVERY DAY FOR 30 DAYS   8/14/2023    lisinopril (PRINIVIL,ZESTRIL) 20 MG tablet Take 1 tablet by mouth Daily.   8/14/2023    lisinopril-hydrochlorothiazide (PRINZIDE,ZESTORETIC) 20-25 MG per tablet TAKE 1 TABLET BY MOUTH EVERY DAY FOR 90 DAYS   8/14/2023         Current Facility-Administered Medications:     ceFAZolin 2000 mg IVPB in 100 mL NS (MBP), 2 g, Intravenous, Once, Gopal Titus MD    lactated ringers infusion 1,000 mL, 1,000 mL, Intravenous, Continuous, Gopal Titus MD, Last Rate: 25 mL/hr at 08/16/23 0559, 1,000 mL at 08/16/23 0559    lactated ringers infusion, 100 mL/hr, Intravenous, Continuous, Sofía Singer MD    lidocaine PF 1% (XYLOCAINE) injection 0.5 mL, 0.5 mL, Intradermal, Once PRN, Gopal Titus MD    sodium chloride 0.9 % flush 3 mL, 3 mL, Intravenous, PRN, Gopal Titus MD    sodium chloride 0.9 % flush 3 mL, 3 mL, Intravenous, Q12H, Sofía Singer MD    sodium chloride 0.9 % flush 3-10 mL, 3-10 mL, Intravenous, PRN, Sofía Singer MD    sodium chloride 0.9 % infusion 40 mL, 40 mL, Intravenous, PRN, Sofía Singer MD    Past Medical History:    Diagnosis Date    Hypertension     Prostate cancer        Past Surgical History:   Procedure Laterality Date    HERNIA REPAIR  1979    KNEE SURGERY Left     PROSTATE BIOPSY  04/13/2023    Dr. Berrios       Social History     Socioeconomic History    Marital status:    Tobacco Use    Smoking status: Never    Smokeless tobacco: Never   Vaping Use    Vaping Use: Never used   Substance and Sexual Activity    Alcohol use: Yes     Comment: occasionally    Drug use: Never    Sexual activity: Defer       Family History   Problem Relation Age of Onset    Colon cancer Father        BP (!) 190/93 (BP Location: Left arm, Patient Position: Sitting)   Pulse 67   Temp 97.6 øF (36.4 øC) (Temporal)   Resp 18   SpO2 93%     Constitutional:  Patient appears well-developed and well-nourished. There are no obvious deformities. No distress. The vital signs are reviewed  Pulmonary/Chest: Effort normal.   GI: Soft. The patient exhibits no distension and no mass. There is no tenderness. There is no rebound and no guarding. No hernia.   Neurological: Patient is alert and oriented to person, place, and time.   Skin: Skin is warm and dry. Not diaphoretic.   Psychiatric:  normal mood and affect. Not agitated.       Lab Results   Component Value Date    CREATININE 0.70 05/10/2023     Lab Results   Component Value Date    CREATININE 0.70 05/10/2023     Lab Results   Component Value Date    WBC 6.41 08/09/2023    HGB 13.5 08/09/2023    HCT 41.7 08/09/2023    MCV 88.2 08/09/2023     08/09/2023     Lab Results   Component Value Date    PSA 13.100 (H) 06/12/2023    PSA 6.5 (H) 07/18/2022    PSA 6.580 (H) 03/14/2022     No results found for: URINECX  Brief Urine Lab Results  (Last result in the past 365 days)        Color   Clarity   Blood   Leuk Est   Nitrite   Protein   CREAT   Urine HCG        05/01/23 1437 Yellow   Clear   Large   Negative   Negative   Negative                     Imaging Results (Last 7 Days)       ** No results  found for the last 168 hours. **            Assessment and Plan  Prostate cancer-EBRT plan  -Plan PERIRECTAL SPACE GELL APPLICATION application and fiduciary markers. The risks, alternatives, and benefits of this treatment recommendation are discussed.  I did answer all questions of the patient.        (Please note that portions of this note were completed with a voice recognition program.)  Gopal Titus MD  08/16/23  07:18 CDT

## 2023-08-16 NOTE — OP NOTE
Operative Summary    Adan Diaz  Date of Procedure: 8/16/2023    Pre-op Diagnosis:   Malignant neoplasm of prostate [C61]    Post-op Diagnosis:     Post-Op Diagnosis Codes:     * Malignant neoplasm of prostate [C61]    Procedure/CPTr Codes:      Procedure(s):  TRANSRECTAL ULTRASOUND GUIDED PLACEMENT PERIRECTAL SPACER APPLICATION FOR RADIATION TREATMENT AND PROSTATE FIDUCIARY MARKERS    Surgeon(s):  Gopal Titus MD    Anesthesia: General    Staff:   Circulator: Ester Kelly RN  Scrub Person: Farrukh Guadarrama; Nathalie Cheng    Indications for procedure:   The patient with a diagnosis of prostate cancer and scheduled to begin definitive radiation therapy presents today for a Barrigel rectal spacer placement. He was recommended to undergo Barrigel placement to decrease the risk of radiation-induced side effects to the rectum. The risks and benefits of the procedure were discussed with the patient and both written and verbal consent were provided by the patient to proceed with Barrigel placement. He has avoided blood thinners for prior to today's procedure. He received antibiotic prophylaxis. He performed a bowel prep with an enema before coming to the hospital today.    Procedure details:  After appropriate anesthesia, positioning, prep and drape, timeout protocol was observed.     Patient is positioned in the dorsal lithotomy position and the perineal skin is prepped with povidone-iodine per the standard practice. Sterile drapes are then placed on the patient's legs, genitals, ultrasound probe and the stepper.  At this point I placed the fiduciary seeds.   Using both transverse sagittal imaging the needles that contained the seeds were inserted in the extreme left of the gland, just to the right of the urethra at the base and just to the right of the urethra at the apex.      Prior to beginning the Barrigel procedure, all critical anatomical structures and the target zone for implantation were  identified using the transrectal biplane ultrasound probe. An 18-gauge Barrigel Applicator Needle was inserted approximately 1-2 cm above the ultrasound probe, through the perineum into the perirectal fat. Under sagittal ultrasound view, the needle tip was advanced over the rectal hump and under the Denonvilliers' fascia to the thickest portion of the perirectal fat. The needle tip position was verified in both sagittal and axial fields. With the tip of the needle in the target zone, the axial field was viewed to confirm the needle was not in the rectal wall (movement of the needle tip without corresponding movement of the rectal wall was noted to confirm perirectal needle placement). While maintaining the desired position, the first syringe of Barrigel was injected. Placement was confirmed on both axial and sagittal views. Injection continued with [two] more syringes, totaling [9cc]. Placement was confirmed on both axial and sagittal views. Optimal visualization of the needle during Barrigel administration was maintained at all times.     No suspected penetration or compromise of the rectal wall occurred. The ultrasound probe and needle were removed. The patient tolerated the procedure well and was transferred to recovery in stable condition.            Estimated Blood Loss: Less than 30 mL    Specimens:                None      Drains: none    Complications: none    Plan: Patient will begin radiation therapy per radiation oncologist plan.      (Please note that portions of this note were completed with a voice recognition program.)  Gopal Titus MD     Date: 8/16/2023  Time: 08:46 CDT  00

## 2023-08-16 NOTE — ANESTHESIA POSTPROCEDURE EVALUATION
Patient: Adan Diaz    Procedure Summary       Date: 08/16/23 Room / Location:  [facility] 08 /  [facility]    Anesthesia Start: 0711 Anesthesia Stop: 0751    Procedure: TRANSRECTAL ULTRASOUND GUIDED PLACEMENT PERIRECTAL SPACER APPLICATION FOR RADIATION TREATMENT AND PROSTATE FIDUCIARY MARKERS (Rectum) Diagnosis:       Malignant neoplasm of prostate      (Malignant neoplasm of prostate [C61])    Surgeons: Gopal Titus MD Provider: Leno Ty CRNA    Anesthesia Type: general ASA Status: 2            Anesthesia Type: general    Vitals  Vitals Value Taken Time   /79 08/16/23 0824   Temp 97.8 øF (36.6 øC) 08/16/23 0815   Pulse 58 08/16/23 0824   Resp 16 08/16/23 0824   SpO2 93 % 08/16/23 0824           Post Anesthesia Care and Evaluation    Patient location during evaluation: PACU  Patient participation: complete - patient participated  Level of consciousness: awake and alert  Pain management: adequate    Airway patency: patent  Anesthetic complications: No anesthetic complications  PONV Status: none  Cardiovascular status: acceptable and hemodynamically stable  Respiratory status: acceptable  Hydration status: acceptable    Comments: Blood pressure (!) 152/102, pulse 58, temperature 97.8 øF (36.6 øC), temperature source Temporal, resp. rate 16, SpO2 92 %.    Patient discharged from PACU based upon Radha score. Please see RN notes for further details

## 2023-08-22 ENCOUNTER — HOSPITAL ENCOUNTER (OUTPATIENT)
Dept: RADIATION ONCOLOGY | Facility: HOSPITAL | Age: 83
Setting detail: RADIATION/ONCOLOGY SERIES
End: 2023-08-22
Payer: MEDICARE

## 2023-08-23 ENCOUNTER — HOSPITAL ENCOUNTER (OUTPATIENT)
Dept: RADIATION ONCOLOGY | Facility: HOSPITAL | Age: 83
Setting detail: RADIATION/ONCOLOGY SERIES
Discharge: HOME | End: 2023-08-23
Payer: MEDICARE

## 2023-08-23 PROCEDURE — 77334 RADIATION TREATMENT AID(S): CPT | Performed by: RADIOLOGY

## 2023-09-01 ENCOUNTER — HOSPITAL ENCOUNTER (OUTPATIENT)
Dept: RADIATION ONCOLOGY | Facility: HOSPITAL | Age: 83
Setting detail: RADIATION/ONCOLOGY SERIES
End: 2023-09-01
Payer: MEDICARE

## 2023-09-05 PROCEDURE — 77301 RADIOTHERAPY DOSE PLAN IMRT: CPT | Performed by: RADIOLOGY

## 2023-09-05 PROCEDURE — 77338 DESIGN MLC DEVICE FOR IMRT: CPT | Performed by: RADIOLOGY

## 2023-09-05 PROCEDURE — 77300 RADIATION THERAPY DOSE PLAN: CPT | Performed by: RADIOLOGY

## 2023-09-06 ENCOUNTER — INFUSION (OUTPATIENT)
Dept: ONCOLOGY | Facility: HOSPITAL | Age: 83
End: 2023-09-06
Payer: MEDICARE

## 2023-09-06 VITALS
WEIGHT: 185.2 LBS | BODY MASS INDEX: 29.77 KG/M2 | OXYGEN SATURATION: 98 % | DIASTOLIC BLOOD PRESSURE: 81 MMHG | SYSTOLIC BLOOD PRESSURE: 162 MMHG | RESPIRATION RATE: 16 BRPM | TEMPERATURE: 97.6 F | HEIGHT: 66 IN | HEART RATE: 63 BPM

## 2023-09-06 DIAGNOSIS — C61 PROSTATE CANCER: Primary | ICD-10-CM

## 2023-09-06 PROCEDURE — 25010000002 LEUPROLIDE 22.5 MG KIT: Performed by: UROLOGY

## 2023-09-06 PROCEDURE — 96402 CHEMO HORMON ANTINEOPL SQ/IM: CPT

## 2023-09-06 RX ADMIN — LEUPROLIDE ACETATE 22.5 MG: KIT SUBCUTANEOUS at 08:46

## 2023-09-06 NOTE — PROGRESS NOTES
Message from Missy BLUM: I would try one more time and then if still a lot of symptoms we can see about switching him but yes its the hormone part getting him.

## 2023-09-06 NOTE — PROGRESS NOTES
Message to KULWANT Silver: Adan Diaz, [birth year], here for Lupron 22.5 mg; he had it in June; states his right breast is very tender (4-5 on pain scale and pretty much a constant pain)  and he thinks it may be getting larger (thinks it started after injection.) He also has some right arm pain and left groin pain (groin pain comes/goes).  /81, 63, 98%, 97.6, 16.  I told him most likely related to injection since it is hormonal, but I just wanted to touch base with you first.  Okay to give injection?

## 2023-09-11 ENCOUNTER — HOSPITAL ENCOUNTER (OUTPATIENT)
Dept: RADIATION ONCOLOGY | Facility: HOSPITAL | Age: 83
Setting detail: RADIATION/ONCOLOGY SERIES
Discharge: HOME | End: 2023-09-11
Payer: MEDICARE

## 2023-09-11 LAB
RAD ONC ARIA COURSE ID: NORMAL
RAD ONC ARIA COURSE LAST TREATMENT DATE: NORMAL
RAD ONC ARIA COURSE START DATE: NORMAL
RAD ONC ARIA COURSE TREATMENT ELAPSED DAYS: 0
RAD ONC ARIA FIRST TREATMENT DATE: NORMAL
RAD ONC ARIA PLAN FRACTIONS TREATED TO DATE: 1
RAD ONC ARIA PLAN ID: NORMAL
RAD ONC ARIA PLAN NAME: NORMAL
RAD ONC ARIA PLAN PRESCRIBED DOSE PER FRACTION: 2.5 GY
RAD ONC ARIA PLAN PRIMARY REFERENCE POINT: NORMAL
RAD ONC ARIA PLAN TOTAL FRACTIONS PRESCRIBED: 16
RAD ONC ARIA PLAN TOTAL PRESCRIBED DOSE: 4000 CGY
RAD ONC ARIA REFERENCE POINT DOSAGE GIVEN TO DATE: 2.5 GY
RAD ONC ARIA REFERENCE POINT ID: NORMAL
RAD ONC ARIA REFERENCE POINT SESSION DOSAGE GIVEN: 2.5 GY

## 2023-09-11 PROCEDURE — 77385: CPT | Performed by: RADIOLOGY

## 2023-09-12 ENCOUNTER — HOSPITAL ENCOUNTER (OUTPATIENT)
Dept: RADIATION ONCOLOGY | Facility: HOSPITAL | Age: 83
Setting detail: RADIATION/ONCOLOGY SERIES
Discharge: HOME | End: 2023-09-12
Payer: MEDICARE

## 2023-09-12 LAB
RAD ONC ARIA COURSE ID: NORMAL
RAD ONC ARIA COURSE LAST TREATMENT DATE: NORMAL
RAD ONC ARIA COURSE START DATE: NORMAL
RAD ONC ARIA COURSE TREATMENT ELAPSED DAYS: 1
RAD ONC ARIA FIRST TREATMENT DATE: NORMAL
RAD ONC ARIA PLAN FRACTIONS TREATED TO DATE: 2
RAD ONC ARIA PLAN ID: NORMAL
RAD ONC ARIA PLAN NAME: NORMAL
RAD ONC ARIA PLAN PRESCRIBED DOSE PER FRACTION: 2.5 GY
RAD ONC ARIA PLAN PRIMARY REFERENCE POINT: NORMAL
RAD ONC ARIA PLAN TOTAL FRACTIONS PRESCRIBED: 16
RAD ONC ARIA PLAN TOTAL PRESCRIBED DOSE: 4000 CGY
RAD ONC ARIA REFERENCE POINT DOSAGE GIVEN TO DATE: 5 GY
RAD ONC ARIA REFERENCE POINT ID: NORMAL
RAD ONC ARIA REFERENCE POINT SESSION DOSAGE GIVEN: 2.5 GY

## 2023-09-12 PROCEDURE — 77385: CPT | Performed by: RADIOLOGY

## 2023-09-13 ENCOUNTER — HOSPITAL ENCOUNTER (OUTPATIENT)
Dept: RADIATION ONCOLOGY | Facility: HOSPITAL | Age: 83
Setting detail: RADIATION/ONCOLOGY SERIES
Discharge: HOME | End: 2023-09-13
Payer: MEDICARE

## 2023-09-13 LAB
RAD ONC ARIA COURSE ID: NORMAL
RAD ONC ARIA COURSE LAST TREATMENT DATE: NORMAL
RAD ONC ARIA COURSE START DATE: NORMAL
RAD ONC ARIA COURSE TREATMENT ELAPSED DAYS: 2
RAD ONC ARIA FIRST TREATMENT DATE: NORMAL
RAD ONC ARIA PLAN FRACTIONS TREATED TO DATE: 3
RAD ONC ARIA PLAN ID: NORMAL
RAD ONC ARIA PLAN NAME: NORMAL
RAD ONC ARIA PLAN PRESCRIBED DOSE PER FRACTION: 2.5 GY
RAD ONC ARIA PLAN PRIMARY REFERENCE POINT: NORMAL
RAD ONC ARIA PLAN TOTAL FRACTIONS PRESCRIBED: 16
RAD ONC ARIA PLAN TOTAL PRESCRIBED DOSE: 4000 CGY
RAD ONC ARIA REFERENCE POINT DOSAGE GIVEN TO DATE: 7.5 GY
RAD ONC ARIA REFERENCE POINT ID: NORMAL
RAD ONC ARIA REFERENCE POINT SESSION DOSAGE GIVEN: 2.5 GY

## 2023-09-13 PROCEDURE — 77385: CPT | Performed by: RADIOLOGY

## 2023-09-13 PROCEDURE — 77336 RADIATION PHYSICS CONSULT: CPT | Performed by: RADIOLOGY

## 2023-09-14 ENCOUNTER — HOSPITAL ENCOUNTER (OUTPATIENT)
Dept: RADIATION ONCOLOGY | Facility: HOSPITAL | Age: 83
Setting detail: RADIATION/ONCOLOGY SERIES
Discharge: HOME | End: 2023-09-14
Payer: MEDICARE

## 2023-09-14 LAB
RAD ONC ARIA COURSE ID: NORMAL
RAD ONC ARIA COURSE LAST TREATMENT DATE: NORMAL
RAD ONC ARIA COURSE START DATE: NORMAL
RAD ONC ARIA COURSE TREATMENT ELAPSED DAYS: 3
RAD ONC ARIA FIRST TREATMENT DATE: NORMAL
RAD ONC ARIA PLAN FRACTIONS TREATED TO DATE: 4
RAD ONC ARIA PLAN ID: NORMAL
RAD ONC ARIA PLAN NAME: NORMAL
RAD ONC ARIA PLAN PRESCRIBED DOSE PER FRACTION: 2.5 GY
RAD ONC ARIA PLAN PRIMARY REFERENCE POINT: NORMAL
RAD ONC ARIA PLAN TOTAL FRACTIONS PRESCRIBED: 16
RAD ONC ARIA PLAN TOTAL PRESCRIBED DOSE: 4000 CGY
RAD ONC ARIA REFERENCE POINT DOSAGE GIVEN TO DATE: 10 GY
RAD ONC ARIA REFERENCE POINT ID: NORMAL
RAD ONC ARIA REFERENCE POINT SESSION DOSAGE GIVEN: 2.5 GY

## 2023-09-14 PROCEDURE — 77385: CPT | Performed by: RADIOLOGY

## 2023-09-15 ENCOUNTER — HOSPITAL ENCOUNTER (OUTPATIENT)
Dept: RADIATION ONCOLOGY | Facility: HOSPITAL | Age: 83
Setting detail: RADIATION/ONCOLOGY SERIES
Discharge: HOME | End: 2023-09-15
Payer: MEDICARE

## 2023-09-15 LAB
RAD ONC ARIA COURSE ID: NORMAL
RAD ONC ARIA COURSE LAST TREATMENT DATE: NORMAL
RAD ONC ARIA COURSE START DATE: NORMAL
RAD ONC ARIA COURSE TREATMENT ELAPSED DAYS: 4
RAD ONC ARIA FIRST TREATMENT DATE: NORMAL
RAD ONC ARIA PLAN FRACTIONS TREATED TO DATE: 5
RAD ONC ARIA PLAN ID: NORMAL
RAD ONC ARIA PLAN NAME: NORMAL
RAD ONC ARIA PLAN PRESCRIBED DOSE PER FRACTION: 2.5 GY
RAD ONC ARIA PLAN PRIMARY REFERENCE POINT: NORMAL
RAD ONC ARIA PLAN TOTAL FRACTIONS PRESCRIBED: 16
RAD ONC ARIA PLAN TOTAL PRESCRIBED DOSE: 4000 CGY
RAD ONC ARIA REFERENCE POINT DOSAGE GIVEN TO DATE: 12.5 GY
RAD ONC ARIA REFERENCE POINT ID: NORMAL
RAD ONC ARIA REFERENCE POINT SESSION DOSAGE GIVEN: 2.5 GY

## 2023-09-15 PROCEDURE — 77385: CPT | Performed by: RADIOLOGY

## 2023-09-18 ENCOUNTER — HOSPITAL ENCOUNTER (OUTPATIENT)
Dept: RADIATION ONCOLOGY | Facility: HOSPITAL | Age: 83
Setting detail: RADIATION/ONCOLOGY SERIES
Discharge: HOME | End: 2023-09-18
Payer: MEDICARE

## 2023-09-18 LAB
RAD ONC ARIA COURSE ID: NORMAL
RAD ONC ARIA COURSE LAST TREATMENT DATE: NORMAL
RAD ONC ARIA COURSE START DATE: NORMAL
RAD ONC ARIA COURSE TREATMENT ELAPSED DAYS: 7
RAD ONC ARIA FIRST TREATMENT DATE: NORMAL
RAD ONC ARIA PLAN FRACTIONS TREATED TO DATE: 6
RAD ONC ARIA PLAN ID: NORMAL
RAD ONC ARIA PLAN NAME: NORMAL
RAD ONC ARIA PLAN PRESCRIBED DOSE PER FRACTION: 2.5 GY
RAD ONC ARIA PLAN PRIMARY REFERENCE POINT: NORMAL
RAD ONC ARIA PLAN TOTAL FRACTIONS PRESCRIBED: 16
RAD ONC ARIA PLAN TOTAL PRESCRIBED DOSE: 4000 CGY
RAD ONC ARIA REFERENCE POINT DOSAGE GIVEN TO DATE: 15 GY
RAD ONC ARIA REFERENCE POINT ID: NORMAL
RAD ONC ARIA REFERENCE POINT SESSION DOSAGE GIVEN: 2.5 GY

## 2023-09-18 PROCEDURE — 77385: CPT | Performed by: RADIOLOGY

## 2023-09-19 ENCOUNTER — HOSPITAL ENCOUNTER (OUTPATIENT)
Dept: RADIATION ONCOLOGY | Facility: HOSPITAL | Age: 83
Setting detail: RADIATION/ONCOLOGY SERIES
Discharge: HOME | End: 2023-09-19
Payer: MEDICARE

## 2023-09-19 LAB
RAD ONC ARIA COURSE ID: NORMAL
RAD ONC ARIA COURSE LAST TREATMENT DATE: NORMAL
RAD ONC ARIA COURSE START DATE: NORMAL
RAD ONC ARIA COURSE TREATMENT ELAPSED DAYS: 8
RAD ONC ARIA FIRST TREATMENT DATE: NORMAL
RAD ONC ARIA PLAN FRACTIONS TREATED TO DATE: 7
RAD ONC ARIA PLAN ID: NORMAL
RAD ONC ARIA PLAN NAME: NORMAL
RAD ONC ARIA PLAN PRESCRIBED DOSE PER FRACTION: 2.5 GY
RAD ONC ARIA PLAN PRIMARY REFERENCE POINT: NORMAL
RAD ONC ARIA PLAN TOTAL FRACTIONS PRESCRIBED: 16
RAD ONC ARIA PLAN TOTAL PRESCRIBED DOSE: 4000 CGY
RAD ONC ARIA REFERENCE POINT DOSAGE GIVEN TO DATE: 17.5 GY
RAD ONC ARIA REFERENCE POINT ID: NORMAL
RAD ONC ARIA REFERENCE POINT SESSION DOSAGE GIVEN: 2.5 GY

## 2023-09-19 PROCEDURE — 77385: CPT | Performed by: RADIOLOGY

## 2023-09-19 PROCEDURE — 77300 RADIATION THERAPY DOSE PLAN: CPT | Performed by: RADIOLOGY

## 2023-09-20 ENCOUNTER — HOSPITAL ENCOUNTER (OUTPATIENT)
Dept: RADIATION ONCOLOGY | Facility: HOSPITAL | Age: 83
Setting detail: RADIATION/ONCOLOGY SERIES
Discharge: HOME | End: 2023-09-20
Payer: MEDICARE

## 2023-09-20 LAB
RAD ONC ARIA COURSE ID: NORMAL
RAD ONC ARIA COURSE LAST TREATMENT DATE: NORMAL
RAD ONC ARIA COURSE START DATE: NORMAL
RAD ONC ARIA COURSE TREATMENT ELAPSED DAYS: 9
RAD ONC ARIA FIRST TREATMENT DATE: NORMAL
RAD ONC ARIA PLAN FRACTIONS TREATED TO DATE: 8
RAD ONC ARIA PLAN ID: NORMAL
RAD ONC ARIA PLAN NAME: NORMAL
RAD ONC ARIA PLAN PRESCRIBED DOSE PER FRACTION: 2.5 GY
RAD ONC ARIA PLAN PRIMARY REFERENCE POINT: NORMAL
RAD ONC ARIA PLAN TOTAL FRACTIONS PRESCRIBED: 16
RAD ONC ARIA PLAN TOTAL PRESCRIBED DOSE: 4000 CGY
RAD ONC ARIA REFERENCE POINT DOSAGE GIVEN TO DATE: 20 GY
RAD ONC ARIA REFERENCE POINT ID: NORMAL
RAD ONC ARIA REFERENCE POINT SESSION DOSAGE GIVEN: 2.5 GY

## 2023-09-20 PROCEDURE — 77336 RADIATION PHYSICS CONSULT: CPT | Performed by: RADIOLOGY

## 2023-09-20 PROCEDURE — 77385: CPT | Performed by: RADIOLOGY

## 2023-09-21 ENCOUNTER — HOSPITAL ENCOUNTER (OUTPATIENT)
Dept: RADIATION ONCOLOGY | Facility: HOSPITAL | Age: 83
Setting detail: RADIATION/ONCOLOGY SERIES
Discharge: HOME | End: 2023-09-21
Payer: MEDICARE

## 2023-09-21 LAB
RAD ONC ARIA COURSE ID: NORMAL
RAD ONC ARIA COURSE LAST TREATMENT DATE: NORMAL
RAD ONC ARIA COURSE START DATE: NORMAL
RAD ONC ARIA COURSE TREATMENT ELAPSED DAYS: 10
RAD ONC ARIA FIRST TREATMENT DATE: NORMAL
RAD ONC ARIA PLAN FRACTIONS TREATED TO DATE: 9
RAD ONC ARIA PLAN ID: NORMAL
RAD ONC ARIA PLAN NAME: NORMAL
RAD ONC ARIA PLAN PRESCRIBED DOSE PER FRACTION: 2.5 GY
RAD ONC ARIA PLAN PRIMARY REFERENCE POINT: NORMAL
RAD ONC ARIA PLAN TOTAL FRACTIONS PRESCRIBED: 16
RAD ONC ARIA PLAN TOTAL PRESCRIBED DOSE: 4000 CGY
RAD ONC ARIA REFERENCE POINT DOSAGE GIVEN TO DATE: 22.5 GY
RAD ONC ARIA REFERENCE POINT ID: NORMAL
RAD ONC ARIA REFERENCE POINT SESSION DOSAGE GIVEN: 2.5 GY

## 2023-09-21 PROCEDURE — 77385: CPT | Performed by: RADIOLOGY

## 2023-09-22 ENCOUNTER — HOSPITAL ENCOUNTER (OUTPATIENT)
Dept: RADIATION ONCOLOGY | Facility: HOSPITAL | Age: 83
Setting detail: RADIATION/ONCOLOGY SERIES
Discharge: HOME | End: 2023-09-22
Payer: MEDICARE

## 2023-09-22 LAB
RAD ONC ARIA COURSE ID: NORMAL
RAD ONC ARIA COURSE LAST TREATMENT DATE: NORMAL
RAD ONC ARIA COURSE START DATE: NORMAL
RAD ONC ARIA COURSE TREATMENT ELAPSED DAYS: 11
RAD ONC ARIA FIRST TREATMENT DATE: NORMAL
RAD ONC ARIA PLAN FRACTIONS TREATED TO DATE: 10
RAD ONC ARIA PLAN ID: NORMAL
RAD ONC ARIA PLAN NAME: NORMAL
RAD ONC ARIA PLAN PRESCRIBED DOSE PER FRACTION: 2.5 GY
RAD ONC ARIA PLAN PRIMARY REFERENCE POINT: NORMAL
RAD ONC ARIA PLAN TOTAL FRACTIONS PRESCRIBED: 16
RAD ONC ARIA PLAN TOTAL PRESCRIBED DOSE: 4000 CGY
RAD ONC ARIA REFERENCE POINT DOSAGE GIVEN TO DATE: 25 GY
RAD ONC ARIA REFERENCE POINT ID: NORMAL
RAD ONC ARIA REFERENCE POINT SESSION DOSAGE GIVEN: 2.5 GY

## 2023-09-22 PROCEDURE — 77385: CPT | Performed by: RADIOLOGY

## 2023-09-25 ENCOUNTER — HOSPITAL ENCOUNTER (OUTPATIENT)
Dept: RADIATION ONCOLOGY | Facility: HOSPITAL | Age: 83
Setting detail: RADIATION/ONCOLOGY SERIES
Discharge: HOME | End: 2023-09-25
Payer: MEDICARE

## 2023-09-25 LAB
RAD ONC ARIA COURSE ID: NORMAL
RAD ONC ARIA COURSE LAST TREATMENT DATE: NORMAL
RAD ONC ARIA COURSE START DATE: NORMAL
RAD ONC ARIA COURSE TREATMENT ELAPSED DAYS: 14
RAD ONC ARIA FIRST TREATMENT DATE: NORMAL
RAD ONC ARIA PLAN FRACTIONS TREATED TO DATE: 11
RAD ONC ARIA PLAN ID: NORMAL
RAD ONC ARIA PLAN NAME: NORMAL
RAD ONC ARIA PLAN PRESCRIBED DOSE PER FRACTION: 2.5 GY
RAD ONC ARIA PLAN PRIMARY REFERENCE POINT: NORMAL
RAD ONC ARIA PLAN TOTAL FRACTIONS PRESCRIBED: 16
RAD ONC ARIA PLAN TOTAL PRESCRIBED DOSE: 4000 CGY
RAD ONC ARIA REFERENCE POINT DOSAGE GIVEN TO DATE: 27.5 GY
RAD ONC ARIA REFERENCE POINT ID: NORMAL
RAD ONC ARIA REFERENCE POINT SESSION DOSAGE GIVEN: 2.5 GY

## 2023-09-25 PROCEDURE — 77385: CPT | Performed by: RADIOLOGY

## 2023-09-26 ENCOUNTER — HOSPITAL ENCOUNTER (OUTPATIENT)
Dept: RADIATION ONCOLOGY | Facility: HOSPITAL | Age: 83
Setting detail: RADIATION/ONCOLOGY SERIES
Discharge: HOME | End: 2023-09-26
Payer: MEDICARE

## 2023-09-26 LAB
RAD ONC ARIA COURSE ID: NORMAL
RAD ONC ARIA COURSE LAST TREATMENT DATE: NORMAL
RAD ONC ARIA COURSE START DATE: NORMAL
RAD ONC ARIA COURSE TREATMENT ELAPSED DAYS: 15
RAD ONC ARIA FIRST TREATMENT DATE: NORMAL
RAD ONC ARIA PLAN FRACTIONS TREATED TO DATE: 12
RAD ONC ARIA PLAN ID: NORMAL
RAD ONC ARIA PLAN NAME: NORMAL
RAD ONC ARIA PLAN PRESCRIBED DOSE PER FRACTION: 2.5 GY
RAD ONC ARIA PLAN PRIMARY REFERENCE POINT: NORMAL
RAD ONC ARIA PLAN TOTAL FRACTIONS PRESCRIBED: 16
RAD ONC ARIA PLAN TOTAL PRESCRIBED DOSE: 4000 CGY
RAD ONC ARIA REFERENCE POINT DOSAGE GIVEN TO DATE: 30 GY
RAD ONC ARIA REFERENCE POINT ID: NORMAL
RAD ONC ARIA REFERENCE POINT SESSION DOSAGE GIVEN: 2.5 GY

## 2023-09-26 PROCEDURE — 77385: CPT | Performed by: RADIOLOGY

## 2023-09-27 ENCOUNTER — HOSPITAL ENCOUNTER (OUTPATIENT)
Dept: RADIATION ONCOLOGY | Facility: HOSPITAL | Age: 83
Setting detail: RADIATION/ONCOLOGY SERIES
Discharge: HOME | End: 2023-09-27
Payer: MEDICARE

## 2023-09-27 LAB
RAD ONC ARIA COURSE ID: NORMAL
RAD ONC ARIA COURSE LAST TREATMENT DATE: NORMAL
RAD ONC ARIA COURSE START DATE: NORMAL
RAD ONC ARIA COURSE TREATMENT ELAPSED DAYS: 16
RAD ONC ARIA FIRST TREATMENT DATE: NORMAL
RAD ONC ARIA PLAN FRACTIONS TREATED TO DATE: 13
RAD ONC ARIA PLAN ID: NORMAL
RAD ONC ARIA PLAN NAME: NORMAL
RAD ONC ARIA PLAN PRESCRIBED DOSE PER FRACTION: 2.5 GY
RAD ONC ARIA PLAN PRIMARY REFERENCE POINT: NORMAL
RAD ONC ARIA PLAN TOTAL FRACTIONS PRESCRIBED: 16
RAD ONC ARIA PLAN TOTAL PRESCRIBED DOSE: 4000 CGY
RAD ONC ARIA REFERENCE POINT DOSAGE GIVEN TO DATE: 32.5 GY
RAD ONC ARIA REFERENCE POINT ID: NORMAL
RAD ONC ARIA REFERENCE POINT SESSION DOSAGE GIVEN: 2.5 GY

## 2023-09-27 PROCEDURE — 77336 RADIATION PHYSICS CONSULT: CPT | Performed by: RADIOLOGY

## 2023-09-27 PROCEDURE — 77385: CPT | Performed by: RADIOLOGY

## 2023-09-28 ENCOUNTER — HOSPITAL ENCOUNTER (OUTPATIENT)
Dept: RADIATION ONCOLOGY | Facility: HOSPITAL | Age: 83
Setting detail: RADIATION/ONCOLOGY SERIES
Discharge: HOME | End: 2023-09-28
Payer: MEDICARE

## 2023-09-28 LAB
RAD ONC ARIA COURSE ID: NORMAL
RAD ONC ARIA COURSE LAST TREATMENT DATE: NORMAL
RAD ONC ARIA COURSE START DATE: NORMAL
RAD ONC ARIA COURSE TREATMENT ELAPSED DAYS: 17
RAD ONC ARIA FIRST TREATMENT DATE: NORMAL
RAD ONC ARIA PLAN FRACTIONS TREATED TO DATE: 14
RAD ONC ARIA PLAN ID: NORMAL
RAD ONC ARIA PLAN NAME: NORMAL
RAD ONC ARIA PLAN PRESCRIBED DOSE PER FRACTION: 2.5 GY
RAD ONC ARIA PLAN PRIMARY REFERENCE POINT: NORMAL
RAD ONC ARIA PLAN TOTAL FRACTIONS PRESCRIBED: 16
RAD ONC ARIA PLAN TOTAL PRESCRIBED DOSE: 4000 CGY
RAD ONC ARIA REFERENCE POINT DOSAGE GIVEN TO DATE: 35 GY
RAD ONC ARIA REFERENCE POINT ID: NORMAL
RAD ONC ARIA REFERENCE POINT SESSION DOSAGE GIVEN: 2.5 GY

## 2023-09-28 PROCEDURE — 77385: CPT | Performed by: RADIOLOGY

## 2023-09-29 ENCOUNTER — HOSPITAL ENCOUNTER (OUTPATIENT)
Dept: RADIATION ONCOLOGY | Facility: HOSPITAL | Age: 83
Setting detail: RADIATION/ONCOLOGY SERIES
Discharge: HOME | End: 2023-09-29
Payer: MEDICARE

## 2023-09-29 LAB
RAD ONC ARIA COURSE ID: NORMAL
RAD ONC ARIA COURSE LAST TREATMENT DATE: NORMAL
RAD ONC ARIA COURSE START DATE: NORMAL
RAD ONC ARIA COURSE TREATMENT ELAPSED DAYS: 18
RAD ONC ARIA FIRST TREATMENT DATE: NORMAL
RAD ONC ARIA PLAN FRACTIONS TREATED TO DATE: 15
RAD ONC ARIA PLAN ID: NORMAL
RAD ONC ARIA PLAN NAME: NORMAL
RAD ONC ARIA PLAN PRESCRIBED DOSE PER FRACTION: 2.5 GY
RAD ONC ARIA PLAN PRIMARY REFERENCE POINT: NORMAL
RAD ONC ARIA PLAN TOTAL FRACTIONS PRESCRIBED: 16
RAD ONC ARIA PLAN TOTAL PRESCRIBED DOSE: 4000 CGY
RAD ONC ARIA REFERENCE POINT DOSAGE GIVEN TO DATE: 37.5 GY
RAD ONC ARIA REFERENCE POINT ID: NORMAL
RAD ONC ARIA REFERENCE POINT SESSION DOSAGE GIVEN: 2.5 GY

## 2023-09-29 PROCEDURE — 77385: CPT | Performed by: RADIOLOGY

## 2023-10-02 ENCOUNTER — HOSPITAL ENCOUNTER (OUTPATIENT)
Dept: RADIATION ONCOLOGY | Facility: HOSPITAL | Age: 83
Setting detail: RADIATION/ONCOLOGY SERIES
Discharge: HOME | End: 2023-10-02
Payer: MEDICARE

## 2023-10-02 ENCOUNTER — HOSPITAL ENCOUNTER (OUTPATIENT)
Dept: RADIATION ONCOLOGY | Facility: HOSPITAL | Age: 83
Setting detail: RADIATION/ONCOLOGY SERIES
End: 2023-10-02
Payer: MEDICARE

## 2023-10-02 LAB
RAD ONC ARIA COURSE ID: NORMAL
RAD ONC ARIA COURSE LAST TREATMENT DATE: NORMAL
RAD ONC ARIA COURSE START DATE: NORMAL
RAD ONC ARIA COURSE TREATMENT ELAPSED DAYS: 21
RAD ONC ARIA FIRST TREATMENT DATE: NORMAL
RAD ONC ARIA PLAN FRACTIONS TREATED TO DATE: 16
RAD ONC ARIA PLAN ID: NORMAL
RAD ONC ARIA PLAN NAME: NORMAL
RAD ONC ARIA PLAN PRESCRIBED DOSE PER FRACTION: 2.5 GY
RAD ONC ARIA PLAN PRIMARY REFERENCE POINT: NORMAL
RAD ONC ARIA PLAN TOTAL FRACTIONS PRESCRIBED: 16
RAD ONC ARIA PLAN TOTAL PRESCRIBED DOSE: 4000 CGY
RAD ONC ARIA REFERENCE POINT DOSAGE GIVEN TO DATE: 40 GY
RAD ONC ARIA REFERENCE POINT ID: NORMAL
RAD ONC ARIA REFERENCE POINT SESSION DOSAGE GIVEN: 2.5 GY

## 2023-10-02 PROCEDURE — 77385: CPT | Performed by: RADIOLOGY

## 2023-10-03 ENCOUNTER — HOSPITAL ENCOUNTER (OUTPATIENT)
Dept: RADIATION ONCOLOGY | Facility: HOSPITAL | Age: 83
Setting detail: RADIATION/ONCOLOGY SERIES
Discharge: HOME | End: 2023-10-03
Payer: MEDICARE

## 2023-10-03 LAB
RAD ONC ARIA COURSE ID: NORMAL
RAD ONC ARIA COURSE LAST TREATMENT DATE: NORMAL
RAD ONC ARIA COURSE START DATE: NORMAL
RAD ONC ARIA COURSE TREATMENT ELAPSED DAYS: 22
RAD ONC ARIA FIRST TREATMENT DATE: NORMAL
RAD ONC ARIA PLAN FRACTIONS TREATED TO DATE: 1
RAD ONC ARIA PLAN ID: NORMAL
RAD ONC ARIA PLAN PRESCRIBED DOSE PER FRACTION: 2.5 GY
RAD ONC ARIA PLAN PRIMARY REFERENCE POINT: NORMAL
RAD ONC ARIA PLAN TOTAL FRACTIONS PRESCRIBED: 4
RAD ONC ARIA PLAN TOTAL PRESCRIBED DOSE: 1000 CGY
RAD ONC ARIA REFERENCE POINT DOSAGE GIVEN TO DATE: 42.5 GY
RAD ONC ARIA REFERENCE POINT ID: NORMAL
RAD ONC ARIA REFERENCE POINT SESSION DOSAGE GIVEN: 2.5 GY

## 2023-10-03 PROCEDURE — 77385: CPT | Performed by: RADIOLOGY

## 2023-10-03 PROCEDURE — 77300 RADIATION THERAPY DOSE PLAN: CPT | Performed by: RADIOLOGY

## 2023-10-04 ENCOUNTER — HOSPITAL ENCOUNTER (OUTPATIENT)
Dept: RADIATION ONCOLOGY | Facility: HOSPITAL | Age: 83
Setting detail: RADIATION/ONCOLOGY SERIES
Discharge: HOME | End: 2023-10-04
Payer: MEDICARE

## 2023-10-04 LAB
RAD ONC ARIA COURSE ID: NORMAL
RAD ONC ARIA COURSE LAST TREATMENT DATE: NORMAL
RAD ONC ARIA COURSE START DATE: NORMAL
RAD ONC ARIA COURSE TREATMENT ELAPSED DAYS: 23
RAD ONC ARIA FIRST TREATMENT DATE: NORMAL
RAD ONC ARIA PLAN FRACTIONS TREATED TO DATE: 2
RAD ONC ARIA PLAN ID: NORMAL
RAD ONC ARIA PLAN PRESCRIBED DOSE PER FRACTION: 2.5 GY
RAD ONC ARIA PLAN PRIMARY REFERENCE POINT: NORMAL
RAD ONC ARIA PLAN TOTAL FRACTIONS PRESCRIBED: 4
RAD ONC ARIA PLAN TOTAL PRESCRIBED DOSE: 1000 CGY
RAD ONC ARIA REFERENCE POINT DOSAGE GIVEN TO DATE: 45 GY
RAD ONC ARIA REFERENCE POINT ID: NORMAL
RAD ONC ARIA REFERENCE POINT SESSION DOSAGE GIVEN: 2.5 GY

## 2023-10-04 PROCEDURE — 77336 RADIATION PHYSICS CONSULT: CPT | Performed by: RADIOLOGY

## 2023-10-04 PROCEDURE — 77385: CPT | Performed by: RADIOLOGY

## 2023-10-05 ENCOUNTER — HOSPITAL ENCOUNTER (OUTPATIENT)
Dept: RADIATION ONCOLOGY | Facility: HOSPITAL | Age: 83
Setting detail: RADIATION/ONCOLOGY SERIES
Discharge: HOME | End: 2023-10-05
Payer: MEDICARE

## 2023-10-05 LAB
RAD ONC ARIA COURSE ID: NORMAL
RAD ONC ARIA COURSE LAST TREATMENT DATE: NORMAL
RAD ONC ARIA COURSE START DATE: NORMAL
RAD ONC ARIA COURSE TREATMENT ELAPSED DAYS: 24
RAD ONC ARIA FIRST TREATMENT DATE: NORMAL
RAD ONC ARIA PLAN FRACTIONS TREATED TO DATE: 3
RAD ONC ARIA PLAN ID: NORMAL
RAD ONC ARIA PLAN PRESCRIBED DOSE PER FRACTION: 2.5 GY
RAD ONC ARIA PLAN PRIMARY REFERENCE POINT: NORMAL
RAD ONC ARIA PLAN TOTAL FRACTIONS PRESCRIBED: 4
RAD ONC ARIA PLAN TOTAL PRESCRIBED DOSE: 1000 CGY
RAD ONC ARIA REFERENCE POINT DOSAGE GIVEN TO DATE: 47.5 GY
RAD ONC ARIA REFERENCE POINT ID: NORMAL
RAD ONC ARIA REFERENCE POINT SESSION DOSAGE GIVEN: 2.5 GY

## 2023-10-05 PROCEDURE — 77385: CPT | Performed by: RADIOLOGY

## 2023-10-06 ENCOUNTER — HOSPITAL ENCOUNTER (OUTPATIENT)
Dept: RADIATION ONCOLOGY | Facility: HOSPITAL | Age: 83
Discharge: HOME | End: 2023-10-06
Payer: MEDICARE

## 2023-10-06 LAB
RAD ONC ARIA COURSE ID: NORMAL
RAD ONC ARIA COURSE LAST TREATMENT DATE: NORMAL
RAD ONC ARIA COURSE START DATE: NORMAL
RAD ONC ARIA COURSE TREATMENT ELAPSED DAYS: 25
RAD ONC ARIA FIRST TREATMENT DATE: NORMAL
RAD ONC ARIA PLAN FRACTIONS TREATED TO DATE: 4
RAD ONC ARIA PLAN ID: NORMAL
RAD ONC ARIA PLAN PRESCRIBED DOSE PER FRACTION: 2.5 GY
RAD ONC ARIA PLAN PRIMARY REFERENCE POINT: NORMAL
RAD ONC ARIA PLAN TOTAL FRACTIONS PRESCRIBED: 4
RAD ONC ARIA PLAN TOTAL PRESCRIBED DOSE: 1000 CGY
RAD ONC ARIA REFERENCE POINT DOSAGE GIVEN TO DATE: 50 GY
RAD ONC ARIA REFERENCE POINT ID: NORMAL
RAD ONC ARIA REFERENCE POINT SESSION DOSAGE GIVEN: 2.5 GY

## 2023-10-06 PROCEDURE — 77385: CPT | Performed by: RADIOLOGY

## 2023-10-09 ENCOUNTER — HOSPITAL ENCOUNTER (OUTPATIENT)
Dept: RADIATION ONCOLOGY | Facility: HOSPITAL | Age: 83
Setting detail: RADIATION/ONCOLOGY SERIES
Discharge: HOME | End: 2023-10-09
Payer: MEDICARE

## 2023-10-09 LAB
RAD ONC ARIA COURSE ID: NORMAL
RAD ONC ARIA COURSE LAST TREATMENT DATE: NORMAL
RAD ONC ARIA COURSE START DATE: NORMAL
RAD ONC ARIA COURSE TREATMENT ELAPSED DAYS: 28
RAD ONC ARIA FIRST TREATMENT DATE: NORMAL
RAD ONC ARIA PLAN FRACTIONS TREATED TO DATE: 1
RAD ONC ARIA PLAN ID: NORMAL
RAD ONC ARIA PLAN PRESCRIBED DOSE PER FRACTION: 2.5 GY
RAD ONC ARIA PLAN PRIMARY REFERENCE POINT: NORMAL
RAD ONC ARIA PLAN TOTAL FRACTIONS PRESCRIBED: 8
RAD ONC ARIA PLAN TOTAL PRESCRIBED DOSE: 2000 CGY
RAD ONC ARIA REFERENCE POINT DOSAGE GIVEN TO DATE: 52.5 GY
RAD ONC ARIA REFERENCE POINT ID: NORMAL
RAD ONC ARIA REFERENCE POINT SESSION DOSAGE GIVEN: 2.5 GY

## 2023-10-09 PROCEDURE — 77385: CPT | Performed by: RADIOLOGY

## 2023-10-10 ENCOUNTER — HOSPITAL ENCOUNTER (OUTPATIENT)
Dept: RADIATION ONCOLOGY | Facility: HOSPITAL | Age: 83
Setting detail: RADIATION/ONCOLOGY SERIES
Discharge: HOME | End: 2023-10-10
Payer: MEDICARE

## 2023-10-10 LAB
RAD ONC ARIA COURSE ID: NORMAL
RAD ONC ARIA COURSE LAST TREATMENT DATE: NORMAL
RAD ONC ARIA COURSE START DATE: NORMAL
RAD ONC ARIA COURSE TREATMENT ELAPSED DAYS: 29
RAD ONC ARIA FIRST TREATMENT DATE: NORMAL
RAD ONC ARIA PLAN FRACTIONS TREATED TO DATE: 2
RAD ONC ARIA PLAN ID: NORMAL
RAD ONC ARIA PLAN PRESCRIBED DOSE PER FRACTION: 2.5 GY
RAD ONC ARIA PLAN PRIMARY REFERENCE POINT: NORMAL
RAD ONC ARIA PLAN TOTAL FRACTIONS PRESCRIBED: 8
RAD ONC ARIA PLAN TOTAL PRESCRIBED DOSE: 2000 CGY
RAD ONC ARIA REFERENCE POINT DOSAGE GIVEN TO DATE: 55 GY
RAD ONC ARIA REFERENCE POINT ID: NORMAL
RAD ONC ARIA REFERENCE POINT SESSION DOSAGE GIVEN: 2.5 GY

## 2023-10-10 PROCEDURE — 77385: CPT | Performed by: RADIOLOGY

## 2023-10-11 ENCOUNTER — HOSPITAL ENCOUNTER (OUTPATIENT)
Dept: RADIATION ONCOLOGY | Facility: HOSPITAL | Age: 83
Discharge: HOME | End: 2023-10-11
Payer: MEDICARE

## 2023-10-11 LAB
RAD ONC ARIA COURSE ID: NORMAL
RAD ONC ARIA COURSE LAST TREATMENT DATE: NORMAL
RAD ONC ARIA COURSE START DATE: NORMAL
RAD ONC ARIA COURSE TREATMENT ELAPSED DAYS: 30
RAD ONC ARIA FIRST TREATMENT DATE: NORMAL
RAD ONC ARIA PLAN FRACTIONS TREATED TO DATE: 3
RAD ONC ARIA PLAN ID: NORMAL
RAD ONC ARIA PLAN PRESCRIBED DOSE PER FRACTION: 2.5 GY
RAD ONC ARIA PLAN PRIMARY REFERENCE POINT: NORMAL
RAD ONC ARIA PLAN TOTAL FRACTIONS PRESCRIBED: 8
RAD ONC ARIA PLAN TOTAL PRESCRIBED DOSE: 2000 CGY
RAD ONC ARIA REFERENCE POINT DOSAGE GIVEN TO DATE: 57.5 GY
RAD ONC ARIA REFERENCE POINT ID: NORMAL
RAD ONC ARIA REFERENCE POINT SESSION DOSAGE GIVEN: 2.5 GY

## 2023-10-11 PROCEDURE — 77336 RADIATION PHYSICS CONSULT: CPT | Performed by: RADIOLOGY

## 2023-10-11 PROCEDURE — 77385: CPT | Performed by: RADIOLOGY

## 2023-10-12 ENCOUNTER — HOSPITAL ENCOUNTER (OUTPATIENT)
Dept: RADIATION ONCOLOGY | Facility: HOSPITAL | Age: 83
Setting detail: RADIATION/ONCOLOGY SERIES
Discharge: HOME | End: 2023-10-12
Payer: MEDICARE

## 2023-10-12 LAB
RAD ONC ARIA COURSE ID: NORMAL
RAD ONC ARIA COURSE LAST TREATMENT DATE: NORMAL
RAD ONC ARIA COURSE START DATE: NORMAL
RAD ONC ARIA COURSE TREATMENT ELAPSED DAYS: 31
RAD ONC ARIA FIRST TREATMENT DATE: NORMAL
RAD ONC ARIA PLAN FRACTIONS TREATED TO DATE: 4
RAD ONC ARIA PLAN ID: NORMAL
RAD ONC ARIA PLAN PRESCRIBED DOSE PER FRACTION: 2.5 GY
RAD ONC ARIA PLAN PRIMARY REFERENCE POINT: NORMAL
RAD ONC ARIA PLAN TOTAL FRACTIONS PRESCRIBED: 8
RAD ONC ARIA PLAN TOTAL PRESCRIBED DOSE: 2000 CGY
RAD ONC ARIA REFERENCE POINT DOSAGE GIVEN TO DATE: 60 GY
RAD ONC ARIA REFERENCE POINT ID: NORMAL
RAD ONC ARIA REFERENCE POINT SESSION DOSAGE GIVEN: 2.5 GY

## 2023-10-12 PROCEDURE — 77385: CPT | Performed by: RADIOLOGY

## 2023-10-13 ENCOUNTER — HOSPITAL ENCOUNTER (OUTPATIENT)
Dept: RADIATION ONCOLOGY | Facility: HOSPITAL | Age: 83
Setting detail: RADIATION/ONCOLOGY SERIES
Discharge: HOME | End: 2023-10-13
Payer: MEDICARE

## 2023-10-13 LAB
RAD ONC ARIA COURSE ID: NORMAL
RAD ONC ARIA COURSE LAST TREATMENT DATE: NORMAL
RAD ONC ARIA COURSE START DATE: NORMAL
RAD ONC ARIA COURSE TREATMENT ELAPSED DAYS: 32
RAD ONC ARIA FIRST TREATMENT DATE: NORMAL
RAD ONC ARIA PLAN FRACTIONS TREATED TO DATE: 5
RAD ONC ARIA PLAN ID: NORMAL
RAD ONC ARIA PLAN PRESCRIBED DOSE PER FRACTION: 2.5 GY
RAD ONC ARIA PLAN PRIMARY REFERENCE POINT: NORMAL
RAD ONC ARIA PLAN TOTAL FRACTIONS PRESCRIBED: 8
RAD ONC ARIA PLAN TOTAL PRESCRIBED DOSE: 2000 CGY
RAD ONC ARIA REFERENCE POINT DOSAGE GIVEN TO DATE: 62.5 GY
RAD ONC ARIA REFERENCE POINT ID: NORMAL
RAD ONC ARIA REFERENCE POINT SESSION DOSAGE GIVEN: 2.5 GY

## 2023-10-13 PROCEDURE — 77385: CPT | Performed by: RADIOLOGY

## 2023-10-16 ENCOUNTER — HOSPITAL ENCOUNTER (OUTPATIENT)
Dept: RADIATION ONCOLOGY | Facility: HOSPITAL | Age: 83
Setting detail: RADIATION/ONCOLOGY SERIES
Discharge: HOME | End: 2023-10-16
Payer: MEDICARE

## 2023-10-16 LAB
RAD ONC ARIA COURSE ID: NORMAL
RAD ONC ARIA COURSE LAST TREATMENT DATE: NORMAL
RAD ONC ARIA COURSE START DATE: NORMAL
RAD ONC ARIA COURSE TREATMENT ELAPSED DAYS: 35
RAD ONC ARIA FIRST TREATMENT DATE: NORMAL
RAD ONC ARIA PLAN FRACTIONS TREATED TO DATE: 6
RAD ONC ARIA PLAN ID: NORMAL
RAD ONC ARIA PLAN PRESCRIBED DOSE PER FRACTION: 2.5 GY
RAD ONC ARIA PLAN PRIMARY REFERENCE POINT: NORMAL
RAD ONC ARIA PLAN TOTAL FRACTIONS PRESCRIBED: 8
RAD ONC ARIA PLAN TOTAL PRESCRIBED DOSE: 2000 CGY
RAD ONC ARIA REFERENCE POINT DOSAGE GIVEN TO DATE: 65 GY
RAD ONC ARIA REFERENCE POINT ID: NORMAL
RAD ONC ARIA REFERENCE POINT SESSION DOSAGE GIVEN: 2.5 GY

## 2023-10-16 PROCEDURE — 77385: CPT | Performed by: RADIOLOGY

## 2023-10-17 ENCOUNTER — HOSPITAL ENCOUNTER (OUTPATIENT)
Dept: RADIATION ONCOLOGY | Facility: HOSPITAL | Age: 83
Discharge: HOME | End: 2023-10-17
Payer: MEDICARE

## 2023-10-17 LAB
RAD ONC ARIA COURSE ID: NORMAL
RAD ONC ARIA COURSE LAST TREATMENT DATE: NORMAL
RAD ONC ARIA COURSE START DATE: NORMAL
RAD ONC ARIA COURSE TREATMENT ELAPSED DAYS: 36
RAD ONC ARIA FIRST TREATMENT DATE: NORMAL
RAD ONC ARIA PLAN FRACTIONS TREATED TO DATE: 7
RAD ONC ARIA PLAN ID: NORMAL
RAD ONC ARIA PLAN PRESCRIBED DOSE PER FRACTION: 2.5 GY
RAD ONC ARIA PLAN PRIMARY REFERENCE POINT: NORMAL
RAD ONC ARIA PLAN TOTAL FRACTIONS PRESCRIBED: 8
RAD ONC ARIA PLAN TOTAL PRESCRIBED DOSE: 2000 CGY
RAD ONC ARIA REFERENCE POINT DOSAGE GIVEN TO DATE: 67.5 GY
RAD ONC ARIA REFERENCE POINT ID: NORMAL
RAD ONC ARIA REFERENCE POINT SESSION DOSAGE GIVEN: 2.5 GY

## 2023-10-17 PROCEDURE — 77385: CPT | Performed by: RADIOLOGY

## 2023-10-18 ENCOUNTER — HOSPITAL ENCOUNTER (OUTPATIENT)
Dept: RADIATION ONCOLOGY | Facility: HOSPITAL | Age: 83
Setting detail: RADIATION/ONCOLOGY SERIES
Discharge: HOME | End: 2023-10-18
Payer: MEDICARE

## 2023-10-18 LAB
RAD ONC ARIA COURSE ID: NORMAL
RAD ONC ARIA COURSE LAST TREATMENT DATE: NORMAL
RAD ONC ARIA COURSE START DATE: NORMAL
RAD ONC ARIA COURSE TREATMENT ELAPSED DAYS: 37
RAD ONC ARIA FIRST TREATMENT DATE: NORMAL
RAD ONC ARIA PLAN FRACTIONS TREATED TO DATE: 8
RAD ONC ARIA PLAN ID: NORMAL
RAD ONC ARIA PLAN PRESCRIBED DOSE PER FRACTION: 2.5 GY
RAD ONC ARIA PLAN PRIMARY REFERENCE POINT: NORMAL
RAD ONC ARIA PLAN TOTAL FRACTIONS PRESCRIBED: 8
RAD ONC ARIA PLAN TOTAL PRESCRIBED DOSE: 2000 CGY
RAD ONC ARIA REFERENCE POINT DOSAGE GIVEN TO DATE: 70 GY
RAD ONC ARIA REFERENCE POINT ID: NORMAL
RAD ONC ARIA REFERENCE POINT SESSION DOSAGE GIVEN: 2.5 GY

## 2023-10-18 PROCEDURE — 77336 RADIATION PHYSICS CONSULT: CPT | Performed by: RADIOLOGY

## 2023-10-18 PROCEDURE — 77385: CPT | Performed by: RADIOLOGY

## 2023-12-04 PROBLEM — Z92.3 HISTORY OF RADIATION THERAPY: Status: ACTIVE | Noted: 2023-12-04

## 2023-12-05 ENCOUNTER — HOSPITAL ENCOUNTER (OUTPATIENT)
Dept: RADIATION ONCOLOGY | Facility: HOSPITAL | Age: 83
Setting detail: RADIATION/ONCOLOGY SERIES
End: 2023-12-05
Payer: MEDICARE

## 2023-12-05 NOTE — PROGRESS NOTES
[facility] Edgar MD      Philippe Haider, APRN  ____________________________________________________________  [facility]  Department of Radiation Oncology  [address] [place] [id number]  Office:  [phone]  Fax: [phone]    DATE:  12/06/2023  PATIENT: Adan Diaz 1940                         MEDICAL RECORD #:  [id number]                                                       REASON FOR FOLLOW UP VISIT  Adan Diaz is a very pleasant 83 y.o. patient that has completed radiation therapy and returns today for inital follow up exam. Denies activity change, appetite change, fatigue, unexpected weight change, urgency/frequency with urination, hematuria, dysuria, nausea/vomiting, diarrhea, light-headedness, weakness, and headaches.     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  He completed 7000 cGy in 28 fractions to the abdomen/pelvis on 10/18/2023.     08/13/2020 - PSA: 3.520    02/02/2022 - PSA: 8.030    03/14/2022 - PSA: 6.580    07/18/2022 - PSA: 6.5    03/23/2023 - PSA: 12.4    04/06/2023 - Appointment with :  Worsening elevated PSA.  We again had a long discussion regarding the natural history of PSA including possible causes including prostate cancer, prostate enlargement, etc.  Patient would like to schedule prostate biopsy in office.    He will follow-up with me in my [place] office next week for transrectal ultrasound-guided prostate biopsy.    We discussed risks of the procedure including but not limited to infection, bleeding, need for additional procedures, possibility of findings to stop finding cancer.    He voiced understanding and provided informed consent to proceed.     04/13/2023 - Prostate biopsy per :  Left apex:  Acinar adenocarcinoma, grade group 3 ([place] score 4+3 = 7).  Involving 2 of 3 cores, 51 to 60% and 1 to 5% of prostatic  tissue.  Percentage of pattern 4: 81 to 90%.  Left mid:  Acinar adenocarcinoma, grade group 3 ([place] score 4+3 = 7).  Involving 2 of 2 cores, 51 to 60% of prostatic tissue per core.  Percentage of pattern 4: 81 to 90%.  Left base:  Acinar adenocarcinoma, grade group 3 (Dori score 4+3 = 7).  Involving 2 of 2 cores, 41-50% of prostatic tissue per core.  Percentage of pattern 4: 71 to 80%.  Perineural invasion identified.  Right apex:  Acinar adenocarcinoma, grade group 3 ([place] score 4+3 = 7).  Involving 2 of 2 cores, 71 to 80% and 21 to 30% of prostatic tissue.  Percentage of pattern 4: Greater than 90%.  Right mid:  Acinar adenocarcinoma, grade group 5 ([place] score 4+5 = 9).  Involving 3 of 3 cores, greater than 90% of prostatic tissue per core.  Percentage of pattern 5: 1 to 5%.  Right base:  Acinar adenocarcinoma, grade group 4 (Dori score 4+4 = 8).  Involving 2 of 2 cores, 60 to 70% and 40 to 50% of prostatic tissue.  Perineural invasion identified.    04/20/2023 - Appointment with Dr. Berrios:  Given his high risk Dori 9 prostate cancer, I did not recommend active surveillance.  I do not recommend surgery given his advanced age. I did recommend consultation with radiation oncology to consider radiation after getting a prostate MRI and bone scan.  We discussed that if he does decide to go with radiation, he will follow-up with my partner Dr. Titus who does SpaceOAR and fiducial prostate markers.  He can then follow-up with me in my [place] clinic for PSA follow-ups after completing radiation    05/10/2023 - MRI Pelvis with and without contrast:  Infiltrative diffuse T2 hypointense masslike signal throughout the prostate extending from base to apex involving the transitional and peripheral zone. Findings are in keeping with biopsy-proven diffuse prostate neoplasm. Within this background of signal abnormality, there is a focal area of markedly increased diffusion restriction in the RIGHT lateral  prostate peripheral zone at the mid gland, likely correlating with the site of biopsy-proven Dori 9 disease.  Signal abnormality in the prostate broadly abuts the prostate margin without direct extension beyond the prostate margin. Additionally, there is signal abnormality and abnormal enhancement extending into the base of the RIGHT seminal vesicle, concerning for invasion of the RIGHT seminal vesicle base.  No pelvic lymphadenopathy or suspicious pelvic bone lesion.    05/15/2023 - Bone Scan:  An area of somewhat asymmetric appearing uptake at the cervicothoracic junction on the posterior images. This is nonspecific. Consider follow-up MRI of the cervical spine that should also include T1 and T2.  Mild uptake within the remainder of the thoracal lumbar spine is likely degenerative. Mild degree of joint uptake elsewhere.    05/17/2023 - Consult with  (Covering for ):  Given that his abdomen has not been imaged and to further investigate equivocal bone scan findings, I recommended completion staging with PSMA PET scan to which patient agreed.  This will be scheduled and patient will return for test results and final recommendations.  If no distant metastatic disease is found, a definitive course of fractionated radiation therapy is recommended to the prostate and regional lymphatics with long course ADT; anticipate a course of 7000 cGy over 28 treatment fractions. The patient verbalizes understanding, voices no further questions and wishes to proceed with recommended therapy.   Will refer back to urologist for initiation of ADT.  If staging work-up does not reveal distant metastatic disease, will refer patient back to urologist for fiducial seed placement and Space OAR gel placement to take place approximately 8 to 10 weeks following initiation of ADT.  Continue ongoing management per primary care physician and other specialists.    06/08/2023 - PET scan:  Abnormal PSMA PET/CT with  hypermetabolism confined to the prostate gland and no convincing evidence of regional pelvic disease or distant metastases.    06/12/2023 - Appointment with :  I anticipate a course of 7000 cGy over 28 treatment fractions. The patient verbalizes understanding, voices no further questions and wishes to proceed with recommended therapy. Will refer patient back to urologist for ADT management, fiducial seed and Space OAR gel placement. He will return after seed/gel placement in early August for CT simulation.   Plan:  PSA today  Plan on 28 daily treatments.  Will refer back to urology for lupron therapy, seed/gel placement.  return early August for markings.    09/11/2023 - 10/18/2023 - Completed radiation course:  Received 7000 cGy in 28 fractions to the abdomen and pelvis via IMRT.         History obtained from  PATIENT, FAMILY, and CHART    PAST MEDICAL HISTORY  Past Medical History:   Diagnosis Date    Hypertension     Prostate cancer       PAST SURGICAL HISTORY  Past Surgical History:   Procedure Laterality Date    HERNIA REPAIR  1979    KNEE SURGERY Left     PROSTATE BIOPSY  04/13/2023    Dr. Berrios      FAMILY HISTORY  family history includes Colon cancer in his father.    SOCIAL HISTORY  Social History     Tobacco Use    Smoking status: Never    Smokeless tobacco: Never   Vaping Use    Vaping Use: Never used   Substance Use Topics    Alcohol use: Yes     Comment: occasionally    Drug use: Never     ALLERGIES  Patient has no known allergies.     MEDICATIONS    Current Outpatient Medications:     amLODIPine (NORVASC) 5 MG tablet, TAKE 1 TABLET BY MOUTH EVERY DAY FOR 30 DAYS, Disp: , Rfl:     lisinopril (PRINIVIL,ZESTRIL) 20 MG tablet, Take 1 tablet by mouth Daily., Disp: , Rfl:     lisinopril-hydrochlorothiazide (PRINZIDE,ZESTORETIC) 20-25 MG per tablet, TAKE 1 TABLET BY MOUTH EVERY DAY FOR 90 DAYS, Disp: , Rfl:     Current outpatient and discharge medications have been reconciled for the  "patient.  Reviewed by: KULWANT Simmons    The following portions of the patient's history were reviewed and updated as appropriate: allergies, current medications, past family history, past medical history, past social history, past surgical history and problem list.    REVIEW OF SYSTEMS  Review of Systems   Constitutional: Negative.  Positive for fatigue. Negative for activity change.   HENT: Negative.     Respiratory:  Negative for cough and shortness of breath.    Cardiovascular: Negative.    Gastrointestinal:  Negative for abdominal pain, blood in stool, constipation and diarrhea.   Genitourinary:  Positive for frequency and urgency. Negative for dysuria and hematuria.   Neurological: Negative.  Negative for dizziness, weakness, light-headedness and headaches.   Hematological: Negative.  Negative for adenopathy.   Psychiatric/Behavioral: Negative.     All other systems reviewed and are negative.    PHYSICAL EXAM  VITAL SIGNS:   Vitals:    12/06/23 0903   BP: (!) 197/84   Weight: 84.8 kg (187 lb)   Height: 167.6 cm (66\")   PainSc:   2   PainLoc: Chest  Comment: right-sided breast/chest     Physical Exam  Vitals reviewed.   Constitutional:       Appearance: Normal appearance.   HENT:      Head: Normocephalic.   Cardiovascular:      Rate and Rhythm: Normal rate and regular rhythm.      Pulses: Normal pulses.      Heart sounds: Normal heart sounds.   Pulmonary:      Effort: Pulmonary effort is normal.      Breath sounds: Normal breath sounds.   Abdominal:      General: Bowel sounds are normal.   Musculoskeletal:         General: Normal range of motion.      Cervical back: Normal range of motion and neck supple.   Skin:     General: Skin is warm.      Capillary Refill: Capillary refill takes less than 2 seconds.   Neurological:      General: No focal deficit present.      Mental Status: He is alert and oriented to person, place, and time.   Psychiatric:         Mood and Affect: Mood normal.         Behavior: " Behavior normal.          Performance Status: ECOG (0) Fully active, able to carry on all predisease performance without restriction    Clinical Quality Measures  -Pain Documented by Standardized Tool, FPS Adan Diaz reports a pain score of 2.  Given his pain assessment as noted, treatment options were discussed and the following options were decided upon as a follow-up plan to address the patient's pain: continuation of current treatment plan for pain and use of non-medical modalities (ice, heat, stretching and/or behavior modifications).      -Advanced Care Planning Advance Care Planning  ACP discussion was held with the patient during this visit. Patient does not have an advance directive, information provided.    -Body Mass Index Screening and Follow-Up Plan BMI is >= 30 and <35. (Class 1 Obesity). The following options were offered after discussion;: none (medical contraindication)    -Tobacco Use: Screening and Cessation Intervention Social History    Tobacco Use      Smoking status: Never      Smokeless tobacco: Never    ASSESSMENT AND PLAN  1. Prostate cancer    2. History of radiation therapy    3. Non-smoker      Orders Placed This Encounter   Procedures    PSA Diagnostic     Standing Status:   Future     Number of Occurrences:   1     Standing Expiration Date:   12/6/2024     Order Specific Question:   Release to patient     Answer:   Routine Release [[id number]]    Ambulatory Referral to Urology     Referral Priority:   Routine     Referral Type:   Consultation     Referral Reason:   Specialty Services Required     Referred to Provider:   Tommie Berrios MD     Requested Specialty:   Urology     Number of Visits Requested:   1     RECOMMENDATIONS:   Adan Diaz is status post completion of radiation therapy to the prostate, returns to our clinic today for inital follow up exam. Diagnosed with stage IIIC (T3b NX MX) prostate adenocarcinoma, pre-PSA of 12.4 and Dori 9 (4+5).  He completed 7000  cGy in 28 fractions to the abdomen/pelvis on 10/18/2023.     On exam, he is currently without symptoms or evidence for recurrent or metastatic disease. Reports no voiding symptoms. He will continue follow-up/surveillance as discussed or sooner if needed and will continue to see the other health care providers as per their scheduling.    Patient Instructions   1) PSA today  2) Return to urology for follow up  3) Return in 1 year    Todays appointment time was spent in counseling, coordination of care and surveillance related to patients diagnosis as well as radiation therapy possible and probable after effects.   Philippe Haider, APRN  12/06/2023

## 2023-12-06 ENCOUNTER — OFFICE VISIT (OUTPATIENT)
Dept: RADIATION ONCOLOGY | Facility: HOSPITAL | Age: 83
End: 2023-12-06
Payer: MEDICARE

## 2023-12-06 ENCOUNTER — INFUSION (OUTPATIENT)
Dept: ONCOLOGY | Facility: HOSPITAL | Age: 83
End: 2023-12-06
Payer: MEDICARE

## 2023-12-06 ENCOUNTER — LAB (OUTPATIENT)
Dept: LAB | Facility: HOSPITAL | Age: 83
End: 2023-12-06
Payer: MEDICARE

## 2023-12-06 VITALS
SYSTOLIC BLOOD PRESSURE: 197 MMHG | DIASTOLIC BLOOD PRESSURE: 84 MMHG | BODY MASS INDEX: 30.05 KG/M2 | HEIGHT: 66 IN | WEIGHT: 187 LBS

## 2023-12-06 VITALS
OXYGEN SATURATION: 96 % | HEART RATE: 68 BPM | HEIGHT: 66 IN | DIASTOLIC BLOOD PRESSURE: 73 MMHG | SYSTOLIC BLOOD PRESSURE: 169 MMHG | RESPIRATION RATE: 18 BRPM | BODY MASS INDEX: 30.05 KG/M2 | TEMPERATURE: 97.6 F | WEIGHT: 187 LBS

## 2023-12-06 DIAGNOSIS — Z78.9 NON-SMOKER: ICD-10-CM

## 2023-12-06 DIAGNOSIS — Z92.3 HISTORY OF RADIATION THERAPY: ICD-10-CM

## 2023-12-06 DIAGNOSIS — C61 PROSTATE CANCER: Primary | ICD-10-CM

## 2023-12-06 DIAGNOSIS — C61 PROSTATE CANCER: ICD-10-CM

## 2023-12-06 LAB — PSA SERPL-MCNC: 0.02 NG/ML (ref 0–4)

## 2023-12-06 PROCEDURE — 25010000002 LEUPROLIDE 22.5 MG KIT: Performed by: UROLOGY

## 2023-12-06 PROCEDURE — 36415 COLL VENOUS BLD VENIPUNCTURE: CPT

## 2023-12-06 PROCEDURE — G0463 HOSPITAL OUTPT CLINIC VISIT: HCPCS | Performed by: RADIOLOGY

## 2023-12-06 PROCEDURE — 96402 CHEMO HORMON ANTINEOPL SQ/IM: CPT

## 2023-12-06 PROCEDURE — 84153 ASSAY OF PSA TOTAL: CPT

## 2023-12-06 RX ADMIN — LEUPROLIDE ACETATE 22.5 MG: KIT SUBCUTANEOUS at 09:59

## 2023-12-06 NOTE — PROGRESS NOTES
Notified Missy BLUM of patient complaints of R breast pain rated 2/10, constant and sore. Occasional groin pain. Per Missy this is a side effect of the hormonal eligard. Patient ok and agreeable to proceed.

## 2024-01-05 NOTE — PROGRESS NOTES
Chief Complaint  Prostate Cancer    Subjective          Adan Diaz presents to [facility] to follow-up prostate cancer.  He completed IMRT since have last seen him.  Summary as below.  He is still taking the leuprolide as he has high risk disease.  Patient denies any possible systemic symptoms of prostate cancer such as weight loss, lower extremity edema, or skeletal pain that could be worrisome for systemic disease.      Stage IIIC (T3b NX MX) prostate adenocarcinoma with pretreatment PSA of 12.4 and [place] 9 (4+5).  He completed 7000 cGy in 28 fractions to the abdomen/pelvis on 10/18/2023.      08/13/2020 - PSA: 3.520     02/02/2022 - PSA: 8.030     03/14/2022 - PSA: 6.580     07/18/2022 - PSA: 6.5     03/23/2023 - PSA: 12.4    04/13/2023 - Prostate biopsy per :  Left apex:  Acinar adenocarcinoma, grade group 3 (Dori score 4+3 = 7).  Involving 2 of 3 cores, 51 to 60% and 1 to 5% of prostatic tissue.  Percentage of pattern 4: 81 to 90%.  Left mid:  Acinar adenocarcinoma, grade group 3 (Dori score 4+3 = 7).  Involving 2 of 2 cores, 51 to 60% of prostatic tissue per core.  Percentage of pattern 4: 81 to 90%.  Left base:  Acinar adenocarcinoma, grade group 3 ([place] score 4+3 = 7).  Involving 2 of 2 cores, 41-50% of prostatic tissue per core.  Percentage of pattern 4: 71 to 80%.  Perineural invasion identified.  Right apex:  Acinar adenocarcinoma, grade group 3 (Dori score 4+3 = 7).  Involving 2 of 2 cores, 71 to 80% and 21 to 30% of prostatic tissue.  Percentage of pattern 4: Greater than 90%.  Right mid:  Acinar adenocarcinoma, grade group 5 (Dori score 4+5 = 9).  Involving 3 of 3 cores, greater than 90% of prostatic tissue per core.  Percentage of pattern 5: 1 to 5%.  Right base:  Acinar adenocarcinoma, grade group 4 ([place] score 4+4 = 8).  Involving 2 of 2 cores, 60 to 70% and 40 to 50% of prostatic tissue.  Perineural invasion identified.    06/08/2023 - PET  "scan:  Abnormal PSMA PET/CT with hypermetabolism confined to the prostate gland and no convincing evidence of regional pelvic disease or distant metastases.  12/06/2023  Leuprolide 22.5 mg I.m.     09/11/2023 - 10/18/2023 - Completed radiation course:  Received 7000 cGy in 28 fractions to the abdomen and pelvis via IMRT    12/06/2023  Leuprolide 22.5 mg I.m.       Current Outpatient Medications:     amLODIPine (NORVASC) 5 MG tablet, TAKE 1 TABLET BY MOUTH EVERY DAY FOR 30 DAYS, Disp: , Rfl:     lisinopril (PRINIVIL,ZESTRIL) 20 MG tablet, Take 1 tablet by mouth Daily., Disp: , Rfl:     lisinopril-hydrochlorothiazide (PRINZIDE,ZESTORETIC) 20-25 MG per tablet, TAKE 1 TABLET BY MOUTH EVERY DAY FOR 90 DAYS, Disp: , Rfl:   Past Medical History:   Diagnosis Date    Hypertension     Prostate cancer      Past Surgical History:   Procedure Laterality Date    HERNIA REPAIR  1979    KNEE SURGERY Left     PROSTATE BIOPSY  04/13/2023    Dr. Berrios           Review of Systems      Objective   PHYSICAL EXAM  Vital Signs:   Temp 96.4 °F (35.8 °C)   Ht 172.7 cm (68\")   Wt 84.3 kg (185 lb 12.8 oz)   BMI 28.25 kg/m²     Physical Exam      DATA  Result Review :              Results for orders placed or performed in visit on 01/23/24   POC Urinalysis Dipstick, Multipro    Specimen: Urine   Result Value Ref Range    Color Yellow Yellow, Straw, Dark Yellow, Beba    Clarity, UA Clear Clear    Glucose, UA Negative Negative mg/dL    Bilirubin Negative Negative    Ketones, UA Negative Negative    Specific Gravity  1.010 1.005 - 1.030    Blood, UA Negative Negative    pH, Urine 6.5 5.0 - 8.0    Protein, POC Negative Negative mg/dL    Urobilinogen, UA 0.2 E.U./dL Normal, 0.2 E.U./dL    Nitrite, UA Negative Negative    Leukocytes Negative Negative     Lab Results   Component Value Date    PSA 0.016 12/06/2023    PSA 13.100 (H) 06/12/2023    PSA 6.5 (H) 07/18/2022          ASSESSMENT AND PLAN          Problem List Items Addressed This Visit  "   None  Visit Diagnoses       Malignant neoplasm of prostate    -  Primary    Relevant Orders    POC Urinalysis Dipstick, Multipro (Completed)        Excellent PSA response to IMRT with adjuvant ADT.  Will continue leuprolide for 2-year based on high risk disease.  Encouraged calcium and vitamin D intake.          FOLLOW UP     Return in about 6 months (around 7/23/2024) for PSA before visit.        (Please note that portions of this note were completed with a voice recognition program.)  Gopal Titus MD  01/24/24  17:03 CST

## 2024-01-23 ENCOUNTER — OFFICE VISIT (OUTPATIENT)
Dept: UROLOGY | Facility: CLINIC | Age: 84
End: 2024-01-23
Payer: MEDICARE

## 2024-01-23 VITALS — BODY MASS INDEX: 28.16 KG/M2 | TEMPERATURE: 96.4 F | HEIGHT: 68 IN | WEIGHT: 185.8 LBS

## 2024-01-23 DIAGNOSIS — C61 MALIGNANT NEOPLASM OF PROSTATE: Primary | ICD-10-CM

## 2024-01-23 LAB
BILIRUB BLD-MCNC: NEGATIVE MG/DL
CLARITY, POC: CLEAR
COLOR UR: YELLOW
GLUCOSE UR STRIP-MCNC: NEGATIVE MG/DL
KETONES UR QL: NEGATIVE
LEUKOCYTE EST, POC: NEGATIVE
NITRITE UR-MCNC: NEGATIVE MG/ML
PH UR: 6.5 [PH] (ref 5–8)
PROT UR STRIP-MCNC: NEGATIVE MG/DL
RBC # UR STRIP: NEGATIVE /UL
SP GR UR: 1.01 (ref 1–1.03)
UROBILINOGEN UR QL: NORMAL

## 2024-01-23 PROCEDURE — 1160F RVW MEDS BY RX/DR IN RCRD: CPT | Performed by: UROLOGY

## 2024-01-23 PROCEDURE — 1159F MED LIST DOCD IN RCRD: CPT | Performed by: UROLOGY

## 2024-01-23 PROCEDURE — 99213 OFFICE O/P EST LOW 20 MIN: CPT | Performed by: UROLOGY

## 2024-01-23 PROCEDURE — 81003 URINALYSIS AUTO W/O SCOPE: CPT | Performed by: UROLOGY

## 2024-02-28 ENCOUNTER — INFUSION (OUTPATIENT)
Dept: ONCOLOGY | Facility: HOSPITAL | Age: 84
End: 2024-02-28
Payer: MEDICARE

## 2024-02-28 VITALS
RESPIRATION RATE: 20 BRPM | WEIGHT: 186 LBS | BODY MASS INDEX: 28.19 KG/M2 | SYSTOLIC BLOOD PRESSURE: 144 MMHG | TEMPERATURE: 97.2 F | OXYGEN SATURATION: 96 % | HEART RATE: 65 BPM | DIASTOLIC BLOOD PRESSURE: 66 MMHG | HEIGHT: 68 IN

## 2024-02-28 DIAGNOSIS — C61 PROSTATE CANCER: Primary | ICD-10-CM

## 2024-02-28 PROCEDURE — 25010000002 LEUPROLIDE 22.5 MG KIT: Performed by: UROLOGY

## 2024-02-28 PROCEDURE — 96402 CHEMO HORMON ANTINEOPL SQ/IM: CPT

## 2024-02-28 RX ADMIN — LEUPROLIDE ACETATE 22.5 MG: KIT SUBCUTANEOUS at 08:25

## 2024-05-22 ENCOUNTER — INFUSION (OUTPATIENT)
Dept: ONCOLOGY | Facility: HOSPITAL | Age: 84
End: 2024-05-22
Payer: MEDICARE

## 2024-05-22 VITALS
WEIGHT: 184 LBS | TEMPERATURE: 97.9 F | HEART RATE: 70 BPM | OXYGEN SATURATION: 99 % | HEIGHT: 68 IN | RESPIRATION RATE: 18 BRPM | BODY MASS INDEX: 27.89 KG/M2 | SYSTOLIC BLOOD PRESSURE: 149 MMHG | DIASTOLIC BLOOD PRESSURE: 82 MMHG

## 2024-05-22 DIAGNOSIS — C61 PROSTATE CANCER: Primary | ICD-10-CM

## 2024-05-22 PROCEDURE — 25010000002 LEUPROLIDE 22.5 MG KIT: Performed by: UROLOGY

## 2024-05-22 PROCEDURE — 96402 CHEMO HORMON ANTINEOPL SQ/IM: CPT

## 2024-05-22 RX ADMIN — LEUPROLIDE ACETATE 22.5 MG: 22.5 INJECTION, SUSPENSION, EXTENDED RELEASE SUBCUTANEOUS at 08:29

## 2024-07-09 NOTE — PROGRESS NOTES
Chief Complaint  Prostate cancer    Subjective          Adan Diaz presents to [facility]   For f/u prostate cancer.   Patient denies any possible systemic symptoms of prostate cancer such as weight loss, lower extremity edema, or skeletal pain that could be worrisome for systemic disease.  Treatment course below.       Stage IIIC (T3b NX MX) prostate adenocarcinoma with pretreatment PSA of 12.4 and Dori 9 (4+5).  He completed 7000 cGy in 28 fractions to the abdomen/pelvis on 10/18/2023.      08/13/2020 - PSA: 3.520     02/02/2022 - PSA: 8.030     03/14/2022 - PSA: 6.580     07/18/2022 - PSA: 6.5     03/23/2023 - PSA: 12.4     04/13/2023 - Prostate biopsy per :  Left apex:  Acinar adenocarcinoma, grade group 3 (Dori score 4+3 = 7).  Involving 2 of 3 cores, 51 to 60% and 1 to 5% of prostatic tissue.  Percentage of pattern 4: 81 to 90%.  Left mid:  Acinar adenocarcinoma, grade group 3 (Dori score 4+3 = 7).  Involving 2 of 2 cores, 51 to 60% of prostatic tissue per core.  Percentage of pattern 4: 81 to 90%.  Left base:  Acinar adenocarcinoma, grade group 3 ([place] score 4+3 = 7).  Involving 2 of 2 cores, 41-50% of prostatic tissue per core.  Percentage of pattern 4: 71 to 80%.  Perineural invasion identified.  Right apex:  Acinar adenocarcinoma, grade group 3 (Dori score 4+3 = 7).  Involving 2 of 2 cores, 71 to 80% and 21 to 30% of prostatic tissue.  Percentage of pattern 4: Greater than 90%.  Right mid:  Acinar adenocarcinoma, grade group 5 (Dori score 4+5 = 9).  Involving 3 of 3 cores, greater than 90% of prostatic tissue per core.  Percentage of pattern 5: 1 to 5%.  Right base:  Acinar adenocarcinoma, grade group 4 ([place] score 4+4 = 8).  Involving 2 of 2 cores, 60 to 70% and 40 to 50% of prostatic tissue.  Perineural invasion identified.     06/08/2023 - PET scan:  Abnormal PSMA PET/CT with hypermetabolism confined to the prostate gland and no convincing  "evidence of regional pelvic disease or distant metastases.  12/06/2023  Leuprolide 22.5 mg I.m.      09/11/2023 - 10/18/2023 - Completed radiation course:  Received 7000 cGy in 28 fractions to the abdomen and pelvis via IMRT     05/22/2024    Leuprolide 22.5 mg his most recent injection          Current Outpatient Medications:     amLODIPine (NORVASC) 5 MG tablet, TAKE 1 TABLET BY MOUTH EVERY DAY FOR 30 DAYS, Disp: , Rfl:     lisinopril (PRINIVIL,ZESTRIL) 20 MG tablet, Take 1 tablet by mouth Daily., Disp: , Rfl:     lisinopril-hydrochlorothiazide (PRINZIDE,ZESTORETIC) 20-25 MG per tablet, TAKE 1 TABLET BY MOUTH EVERY DAY FOR 90 DAYS, Disp: , Rfl:   Past Medical History:   Diagnosis Date    Hypertension     Prostate cancer      Past Surgical History:   Procedure Laterality Date    HERNIA REPAIR  1979    KNEE SURGERY Left     PROSTATE BIOPSY  04/13/2023    Dr. Berrios           Review of Systems      Objective   PHYSICAL EXAM  Vital Signs:   Temp 96.9 °F (36.1 °C)   Ht 175.3 cm (69\")   Wt 82.1 kg (181 lb)   BMI 26.73 kg/m²     Physical Exam      DATA  Result Review :              Results for orders placed or performed in visit on 07/23/24   POC Urinalysis Dipstick, Multipro    Specimen: Urine   Result Value Ref Range    Color Yellow Yellow, Straw, Dark Yellow, Beba    Clarity, UA Clear Clear    Glucose, UA Negative Negative mg/dL    Bilirubin Negative Negative    Ketones, UA Negative Negative    Specific Gravity  1.010 1.005 - 1.030    Blood, UA Negative Negative    pH, Urine 7.0 5.0 - 8.0    Protein, POC Negative Negative mg/dL    Urobilinogen, UA 0.2 E.U./dL Normal, 0.2 E.U./dL    Nitrite, UA Negative Negative    Leukocytes Negative Negative             Lab Results   Component Value Date    PSA <0.014 07/16/2024    PSA 0.016 12/06/2023    PSA 13.100 (H) 06/12/2023                ASSESSMENT AND PLAN          Problem List Items Addressed This Visit    None  Visit Diagnoses       Malignant neoplasm of prostate    -  " Primary    Relevant Orders    POC Urinalysis Dipstick, Multipro (Completed)    PSA Diagnostic (Completed)        PSA shows excellent control this time of his prostate cancer.  We discussed leuprolide including duration of therapy as recommended by guidelines due to high risk disease.  He is tolerating this reasonably well but does get significant vasomotor symptoms with hot flashes.  He says these are tolerable so I would like to continue this.  We talked about length of therapy. Last 22.5 mg shot would be around 8/2025      FOLLOW UP     Return in about 6 months (around 1/23/2025) for PSA before visit.        (Please note that portions of this note were completed with a voice recognition program.)  Gopal Titus MD  07/23/24  09:40 CDT

## 2024-07-16 ENCOUNTER — LAB (OUTPATIENT)
Dept: LAB | Facility: HOSPITAL | Age: 84
End: 2024-07-16
Payer: MEDICARE

## 2024-07-16 DIAGNOSIS — C61 MALIGNANT NEOPLASM OF PROSTATE: ICD-10-CM

## 2024-07-16 LAB — PSA SERPL-MCNC: <0.014 NG/ML (ref 0–4)

## 2024-07-16 PROCEDURE — 36415 COLL VENOUS BLD VENIPUNCTURE: CPT

## 2024-07-16 PROCEDURE — 84153 ASSAY OF PSA TOTAL: CPT

## 2024-07-23 ENCOUNTER — OFFICE VISIT (OUTPATIENT)
Dept: UROLOGY | Facility: CLINIC | Age: 84
End: 2024-07-23
Payer: MEDICARE

## 2024-07-23 VITALS — BODY MASS INDEX: 26.81 KG/M2 | HEIGHT: 69 IN | WEIGHT: 181 LBS | TEMPERATURE: 96.9 F

## 2024-07-23 DIAGNOSIS — C61 MALIGNANT NEOPLASM OF PROSTATE: Primary | ICD-10-CM

## 2024-07-23 LAB
BILIRUB BLD-MCNC: NEGATIVE MG/DL
CLARITY, POC: CLEAR
COLOR UR: YELLOW
GLUCOSE UR STRIP-MCNC: NEGATIVE MG/DL
KETONES UR QL: NEGATIVE
LEUKOCYTE EST, POC: NEGATIVE
NITRITE UR-MCNC: NEGATIVE MG/ML
PH UR: 7 [PH] (ref 5–8)
PROT UR STRIP-MCNC: NEGATIVE MG/DL
RBC # UR STRIP: NEGATIVE /UL
SP GR UR: 1.01 (ref 1–1.03)
UROBILINOGEN UR QL: NORMAL

## 2024-08-14 ENCOUNTER — INFUSION (OUTPATIENT)
Dept: ONCOLOGY | Facility: HOSPITAL | Age: 84
End: 2024-08-14
Payer: MEDICARE

## 2024-08-14 VITALS
HEIGHT: 69 IN | DIASTOLIC BLOOD PRESSURE: 79 MMHG | OXYGEN SATURATION: 99 % | WEIGHT: 181.2 LBS | RESPIRATION RATE: 18 BRPM | HEART RATE: 66 BPM | SYSTOLIC BLOOD PRESSURE: 140 MMHG | BODY MASS INDEX: 26.84 KG/M2 | TEMPERATURE: 97 F

## 2024-08-14 DIAGNOSIS — C61 PROSTATE CANCER: Primary | ICD-10-CM

## 2024-08-14 PROCEDURE — 96402 CHEMO HORMON ANTINEOPL SQ/IM: CPT

## 2024-08-14 PROCEDURE — 25010000002 LEUPROLIDE 22.5 MG KIT: Performed by: UROLOGY

## 2024-08-14 RX ADMIN — LEUPROLIDE ACETATE 22.5 MG: 22.5 INJECTION, SUSPENSION, EXTENDED RELEASE SUBCUTANEOUS at 10:06

## 2024-11-06 ENCOUNTER — INFUSION (OUTPATIENT)
Dept: ONCOLOGY | Facility: HOSPITAL | Age: 84
End: 2024-11-06
Payer: MEDICARE

## 2024-11-06 VITALS
HEIGHT: 69 IN | OXYGEN SATURATION: 96 % | BODY MASS INDEX: 26.66 KG/M2 | HEART RATE: 74 BPM | TEMPERATURE: 97.3 F | RESPIRATION RATE: 18 BRPM | WEIGHT: 180 LBS | DIASTOLIC BLOOD PRESSURE: 91 MMHG | SYSTOLIC BLOOD PRESSURE: 170 MMHG

## 2024-11-06 DIAGNOSIS — C61 PROSTATE CANCER: Primary | ICD-10-CM

## 2024-11-06 PROCEDURE — 96402 CHEMO HORMON ANTINEOPL SQ/IM: CPT

## 2024-11-06 PROCEDURE — 25010000002 LEUPROLIDE 22.5 MG KIT: Performed by: UROLOGY

## 2024-11-06 RX ADMIN — LEUPROLIDE ACETATE 22.5 MG: 22.5 INJECTION, SUSPENSION, EXTENDED RELEASE SUBCUTANEOUS at 10:28

## 2024-12-02 NOTE — PROGRESS NOTES
[facility]  Radiation Oncology Clinic   Mohan Soto MD, FACR  Philippe Haider APRN  _______________________________________________  [facility]  Department of Radiation Oncology  [address] [place] [id number]  Office: [phone]  Fax: [phone]    DATE: 12/05/2024  PATIENT: Adan Diaz  1940                         MEDICAL RECORD #: [id number]    1. Prostate cancer    2. History of radiation therapy    3. Prophylactic use of leuprolide acetate (Lupron)    4. Non-smoker                                          REASON FOR VISIT:    Chief Complaint   Patient presents with    Prostate Cancer       REASON FOR FOLLOW UP VISIT  Adan Diaz is a very pleasant 84 y.o. patient that has completed radiation therapy and returns today for routine follow up exam.      History of Present Illness:  Diagnosed with very high-risk prostate cancer, at least stage IIIC (T3b NX MX) prostate adenocarcinoma with pretreatment PSA of 12.4 and Dori score 9 (4+5).  He completed 7000 cGy in 28 fractions to the abdomen/pelvis on 10/18/2023.     08/13/2020 - PSA: 3.520    02/02/2022 - PSA: 8.030    03/14/2022 - PSA: 6.580    07/18/2022 - PSA: 6.5    03/23/2023 - PSA: 12.4    04/06/2023 - Appointment with :  Worsening elevated PSA.  We again had a long discussion regarding the natural history of PSA including possible causes including prostate cancer, prostate enlargement, etc.  Patient would like to schedule prostate biopsy in office.    He will follow-up with me in my [place] office next week for transrectal ultrasound-guided prostate biopsy.    We discussed risks of the procedure including but not limited to infection, bleeding, need for additional procedures, possibility of findings to stop finding cancer.    He voiced understanding and provided informed consent to proceed.     04/13/2023 - Prostate biopsy per :  Left apex:  Acinar  adenocarcinoma, grade group 3 ([place] score 4+3 = 7).  Involving 2 of 3 cores, 51 to 60% and 1 to 5% of prostatic tissue.  Percentage of pattern 4: 81 to 90%.  Left mid:  Acinar adenocarcinoma, grade group 3 ([place] score 4+3 = 7).  Involving 2 of 2 cores, 51 to 60% of prostatic tissue per core.  Percentage of pattern 4: 81 to 90%.  Left base:  Acinar adenocarcinoma, grade group 3 ([place] score 4+3 = 7).  Involving 2 of 2 cores, 41-50% of prostatic tissue per core.  Percentage of pattern 4: 71 to 80%.  Perineural invasion identified.  Right apex:  Acinar adenocarcinoma, grade group 3 ([place] score 4+3 = 7).  Involving 2 of 2 cores, 71 to 80% and 21 to 30% of prostatic tissue.  Percentage of pattern 4: Greater than 90%.  Right mid:  Acinar adenocarcinoma, grade group 5 (Dori score 4+5 = 9).  Involving 3 of 3 cores, greater than 90% of prostatic tissue per core.  Percentage of pattern 5: 1 to 5%.  Right base:  Acinar adenocarcinoma, grade group 4 ([place] score 4+4 = 8).  Involving 2 of 2 cores, 60 to 70% and 40 to 50% of prostatic tissue.  Perineural invasion identified.    04/20/2023 - Appointment with Dr. Berrios:  Given his high risk Dori 9 prostate cancer, I did not recommend active surveillance.  I do not recommend surgery given his advanced age. I did recommend consultation with radiation oncology to consider radiation after getting a prostate MRI and bone scan.  We discussed that if he does decide to go with radiation, he will follow-up with my partner Dr. Titus who does SpaceOAR and fiducial prostate markers.  He can then follow-up with me in my [place] clinic for PSA follow-ups after completing radiation    05/10/2023 - MRI Pelvis with and without contrast:  Infiltrative diffuse T2 hypointense masslike signal throughout the prostate extending from base to apex involving the transitional and peripheral zone. Findings are in keeping with biopsy-proven diffuse prostate neoplasm. Within this background  of signal abnormality, there is a focal area of markedly increased diffusion restriction in the RIGHT lateral prostate peripheral zone at the mid gland, likely correlating with the site of biopsy-proven Ne[place] 9 disease.  Signal abnormality in the prostate broadly abuts the prostate margin without direct extension beyond the prostate margin. Additionally, there is signal abnormality and abnormal enhancement extending into the base of the RIGHT seminal vesicle, concerning for invasion of the RIGHT seminal vesicle base.  No pelvic lymphadenopathy or suspicious pelvic bone lesion.    05/15/2023 - Bone Scan:  An area of somewhat asymmetric appearing uptake at the cervicothoracic junction on the posterior images. This is nonspecific. Consider follow-up MRI of the cervical spine that should also include T1 and T2.  Mild uptake within the remainder of the thoracal lumbar spine is likely degenerative. Mild degree of joint uptake elsewhere.    05/17/2023 - Consult with  (Covering for ):  Given that his abdomen has not been imaged and to further investigate equivocal bone scan findings, I recommended completion staging with PSMA PET scan to which patient agreed.  This will be scheduled and patient will return for test results and final recommendations.  If no distant metastatic disease is found, a definitive course of fractionated radiation therapy is recommended to the prostate and regional lymphatics with long course ADT; anticipate a course of 7000 cGy over 28 treatment fractions. The patient verbalizes understanding, voices no further questions and wishes to proceed with recommended therapy.   Will refer back to urologist for initiation of ADT.  If staging work-up does not reveal distant metastatic disease, will refer patient back to urologist for fiducial seed placement and Space OAR gel placement to take place approximately 8 to 10 weeks following initiation of ADT.  Continue ongoing management per  primary care physician and other specialists.    06/08/2023 - PET scan:  Abnormal PSMA PET/CT with hypermetabolism confined to the prostate gland and no convincing evidence of regional pelvic disease or distant metastases.    06/12/2023 - Appointment with :  I anticipate a course of 7000 cGy over 28 treatment fractions. The patient verbalizes understanding, voices no further questions and wishes to proceed with recommended therapy. Will refer patient back to urologist for ADT management, fiducial seed and Space OAR gel placement. He will return after seed/gel placement in early August for CT simulation.   Plan:  PSA today  Plan on 28 daily treatments.  Will refer back to urology for lupron therapy, seed/gel placement.  return early August for markings.    06/12/2023 - PSA: 13.100    09/11/2023 - 10/18/2023 - Completed radiation course:  Received 7000 cGy in 28 fractions to the abdomen and pelvis via IMRT.     12/06/2023 - Appointment with :  PSA today  Return to urology for follow up  Return in 1 year    12/06/2023 - PSA: 0.016    01/23/2024 - Appointment with :  Excellent PSA response to IMRT with adjuvant ADT.  Will continue leuprolide for 2-year based on high risk disease.  Encouraged calcium and vitamin D intake.  Follow up in 6 months    07/16/2024 - PSA: <0.014    05/22/2024 - Received 3 month Lupron injection per .    07/23/2024 - Appointment with :  PSA shows excellent control this time of his prostate cancer.  We discussed leuprolide including duration of therapy as recommended by guidelines due to high risk disease.  He is tolerating this reasonably well but does get significant vasomotor symptoms with hot flashes.  He says these are tolerable so I would like to continue this.  We talked about length of therapy. Last 22.5 mg shot would be around 8/2025  FOLLOW UP:  Return in about 6 months (around 1/23/2025) for PSA before visit.    08/14/2024 - Received 3-month  "Lupron injection per .    11/06/2024 - Received 3-month Lupron injection per .    History obtained from  PATIENT and CHART    PAST MEDICAL HISTORY  Past Medical History:   Diagnosis Date    Hypertension     Prostate cancer       PAST SURGICAL HISTORY  Past Surgical History:   Procedure Laterality Date    HERNIA REPAIR  1979    KNEE SURGERY Left     PROSTATE BIOPSY  04/13/2023    Dr. Berrios      FAMILY HISTORY  family history includes Colon cancer in his father.    SOCIAL HISTORY  Social History     Tobacco Use    Smoking status: Never    Smokeless tobacco: Never   Vaping Use    Vaping status: Never Used   Substance Use Topics    Alcohol use: Yes     Comment: occasionally    Drug use: Never     ALLERGIES  Patient has no known allergies.     MEDICATIONS    Current Outpatient Medications:     amLODIPine (NORVASC) 5 MG tablet, TAKE 1 TABLET BY MOUTH EVERY DAY FOR 30 DAYS, Disp: , Rfl:     lisinopril (PRINIVIL,ZESTRIL) 20 MG tablet, Take 1 tablet by mouth Daily., Disp: , Rfl:     lisinopril-hydrochlorothiazide (PRINZIDE,ZESTORETIC) 20-25 MG per tablet, TAKE 1 TABLET BY MOUTH EVERY DAY FOR 90 DAYS, Disp: , Rfl:     Current outpatient and discharge medications have been reconciled for the patient.  Reviewed by: KULWANT Simmons    The following portions of the patient's history were reviewed and updated as appropriate: allergies, current medications, past family history, past medical history, past social history, past surgical history and problem list.    REVIEW OF SYSTEMS  Review of Systems   Constitutional:  Positive for fatigue (\"I can tell I'm losing muscle\"). Negative for appetite change and unexpected weight change.   HENT: Negative.     Eyes: Negative.    Respiratory: Negative.     Cardiovascular: Negative.    Gastrointestinal:  Negative for anal bleeding, blood in stool and diarrhea.   Endocrine: Positive for heat intolerance (\"very rare\").   Genitourinary:  Positive for frequency (worse in am, " improves throughout the day) and urgency (occasional). Negative for difficulty urinating, dysuria and hematuria.   Musculoskeletal: Negative.    Skin: Negative.    Allergic/Immunologic: Negative.    Neurological: Negative.    Hematological: Negative.    Psychiatric/Behavioral: Negative.         GÓMEZ Questionnaire (Sexual Health Inventory for Men)  Over the past 6 months…    1) How do you rate your confidence that you could get and keep an erection? 1 - Very Low   2)  When you had erections with sexual stimulation, how often were your erections hard enough for penetration (entering your partner)? 0 - No sexual activity   3)  During sexual intercourse, how often were you able to maintain your erection after you had penetrated (entered) your partner? 0 - Did not attempt intercourse   4) During sexual intercourse, how difficult was it to maintain your erection to completion of intercourse? 0 - Did not attempt intercourse   5) When you attempted sexual intercourse, how often was it satisfactory for you? 0 - Did not attempt intercourse   Total score:  1            1-7 Severe ED    8-11 Moderate ED    12-16 Mild to Moderate ED    17-21 Mild ED    22-25 No Signs of ED     IPSS Questionnaire (AUA-7):  Over the past month…    1)  How often have you had a sensation of not emptying your bladder completely after you finish urinating?  1 - Less than 1 time in 5   2)  How often have you had to urinate again less than two hours after you finished urinating? 3 - About half the time   3)  How often have you found you stopped and started again several times when you urinated?  1 - Less than 1 time in 5   4) How difficult have you found it to postpone urination?  2 - Less than half the time   5) How often have you had a weak urinary stream?  2 - Less than half the time   6) How often have you had to push or strain to begin urination?  1 - Less than 1 time in 5   7) How many times did you most typically get up to urinate from the time  "you went to bed until the time you got up in the morning?  3 - 3 times   Total score:  0-7 mildly symptomatic                                               Total - 13    8-19 moderately symptomatic    20-35 severely symptomatic     PHYSICAL EXAM  VITAL SIGNS:   Vitals:    12/05/24 1018   BP: 171/85   Pulse: 74   Weight: 83.5 kg (184 lb)   Height: 175.3 cm (69\")   PainSc: 0-No pain   PainLoc: Groin  Comment: left groin \"hernia\"       Physical Exam  Vitals reviewed.   Constitutional:       Appearance: Normal appearance.   HENT:      Head: Normocephalic.      Nose: Nose normal.   Eyes:      Pupils: Pupils are equal, round, and reactive to light.   Cardiovascular:      Rate and Rhythm: Normal rate and regular rhythm.      Pulses: Normal pulses.      Heart sounds: Normal heart sounds.   Pulmonary:      Effort: Pulmonary effort is normal. No respiratory distress.      Breath sounds: Normal breath sounds. No wheezing.   Abdominal:      General: Bowel sounds are normal.      Palpations: There is no mass.   Musculoskeletal:         General: Normal range of motion.      Cervical back: Normal range of motion and neck supple. No tenderness.   Lymphadenopathy:      Cervical: No cervical adenopathy.   Skin:     General: Skin is warm and dry.      Capillary Refill: Capillary refill takes less than 2 seconds.   Neurological:      General: No focal deficit present.      Mental Status: He is alert and oriented to person, place, and time.      Motor: No weakness.   Psychiatric:         Mood and Affect: Mood normal.         Behavior: Behavior normal.         Performance Status: ECOG (1) Restricted in physically strenuous activity, ambulatory and able to do work of light nature    Clinical Quality Measures  - Pain Documented by Standardized Tool, FPS Adan Diaz reports a pain score of 0.  Given his pain assessment as noted, treatment options were discussed and the following options were decided upon as a follow-up plan to address the " patient's pain: continuation of current treatment plan for pain.    - Body Mass Index Screening and Follow-Up Plan  BMI is >= 25 and <30. (Overweight) The following options were offered after discussion;: referral to primary care    - Tobacco Use: Screening and Cessation Intervention  Social History    Tobacco Use      Smoking status: Never      Smokeless tobacco: Never    - Advanced Care Planning Advance Care Planning   ACP discussion was held with the patient during this visit. Patient does not have an advance directive, information provided.    - PHQ-2 Depression Screening:  Little interest or pleasure in doing things? Not at all   Feeling down, depressed, or hopeless? Not at all   PHQ-2 Total Score 0     ASSESSMENT AND PLAN  1. Prostate cancer    2. History of radiation therapy    3. Prophylactic use of leuprolide acetate (Lupron)    4. Non-smoker      No orders of the defined types were placed in this encounter.    RECOMMENDATIONS:   Adan Diaz is status post completion of radiation therapy to the prostate, returns to our clinic today for routine follow up exam. Diagnosed with very high-risk prostate cancer, at least stage IIIC (T3b NX MX) prostate adenocarcinoma with pretreatment PSA of 12.4 and [place] score 9 (4+5).  He completed 7000 cGy in 28 fractions to the abdomen/pelvis on 10/18/2023.     On exam, he is currently without symptoms or evidence for recurrent or metastatic disease. He continues on lupron therapy and his PSA remains undetectable. He will continue follow-up/surveillance as discussed in 1 year or sooner if needed and will continue to see the other health care providers as per their scheduling.    Patient Instructions   1) Return in 1 year    Return in about 1 year (around 12/5/2025).     Time Spent: I spent 44 minutes caring for Adan on this date of service. This time includes time spent by me in the following activities: preparing for the visit, reviewing tests, obtaining and/or  reviewing a separately obtained history, performing a medically appropriate examination and/or evaluation, counseling and educating the patient/family/caregiver, ordering medications, tests, or procedures, referring and communicating with other health care professionals, documenting information in the medical record, independently interpreting results and communicating that information with the patient/family/caregiver, and care coordination.     Philippe Haider, APRN  12/05/2024

## 2024-12-04 ENCOUNTER — HOSPITAL ENCOUNTER (OUTPATIENT)
Dept: RADIATION ONCOLOGY | Facility: HOSPITAL | Age: 84
Setting detail: RADIATION/ONCOLOGY SERIES
End: 2024-12-04
Payer: MEDICARE

## 2024-12-05 ENCOUNTER — OFFICE VISIT (OUTPATIENT)
Age: 84
End: 2024-12-05
Payer: MEDICARE

## 2024-12-05 VITALS
HEIGHT: 69 IN | WEIGHT: 184 LBS | HEART RATE: 74 BPM | SYSTOLIC BLOOD PRESSURE: 171 MMHG | BODY MASS INDEX: 27.25 KG/M2 | DIASTOLIC BLOOD PRESSURE: 85 MMHG

## 2024-12-05 DIAGNOSIS — C61 PROSTATE CANCER: Primary | ICD-10-CM

## 2024-12-05 DIAGNOSIS — Z92.3 HISTORY OF RADIATION THERAPY: ICD-10-CM

## 2024-12-05 DIAGNOSIS — Z78.9 NON-SMOKER: ICD-10-CM

## 2024-12-05 DIAGNOSIS — Z79.818 PROPHYLACTIC USE OF LEUPROLIDE ACETATE (LUPRON): ICD-10-CM

## 2024-12-05 PROCEDURE — G0463 HOSPITAL OUTPT CLINIC VISIT: HCPCS | Performed by: RADIOLOGY

## 2025-01-06 NOTE — PROGRESS NOTES
Chief Complaint  Prostate Cancer     Subjective          Adan Diaz presents to [facility] UR[facility]   Here to f/u prostate cancer. Had high risk dz. Underwent EBRT + ADT. Plan 2-3 years of ADT. Tolerating very well. Few hot flashes. Patient denies any possible systemic symptoms of prostate cancer such as weight loss, lower extremity edema, or skeletal pain that could be worrisome for systemic disease.        Stage IIIC (T3b NX MX) prostate adenocarcinoma with pretreatment PSA of 12.4 and Dori 9 (4+5).  He completed 7000 cGy in 28 fractions to the abdomen/pelvis on 10/18/2023.      08/13/2020 - PSA: 3.520     02/02/2022 - PSA: 8.030     03/14/2022 - PSA: 6.580     07/18/2022 - PSA: 6.5     03/23/2023 - PSA: 12.4     04/13/2023 - Prostate biopsy per :  Left apex:  Acinar adenocarcinoma, grade group 3 ([place] score 4+3 = 7).  Involving 2 of 3 cores, 51 to 60% and 1 to 5% of prostatic tissue.  Percentage of pattern 4: 81 to 90%.  Left mid:  Acinar adenocarcinoma, grade group 3 ([place] score 4+3 = 7).  Involving 2 of 2 cores, 51 to 60% of prostatic tissue per core.  Percentage of pattern 4: 81 to 90%.  Left base:  Acinar adenocarcinoma, grade group 3 ([place] score 4+3 = 7).  Involving 2 of 2 cores, 41-50% of prostatic tissue per core.  Percentage of pattern 4: 71 to 80%.  Perineural invasion identified.  Right apex:  Acinar adenocarcinoma, grade group 3 (Dori score 4+3 = 7).  Involving 2 of 2 cores, 71 to 80% and 21 to 30% of prostatic tissue.  Percentage of pattern 4: Greater than 90%.  Right mid:  Acinar adenocarcinoma, grade group 5 ([place] score 4+5 = 9).  Involving 3 of 3 cores, greater than 90% of prostatic tissue per core.  Percentage of pattern 5: 1 to 5%.  Right base:  Acinar adenocarcinoma, grade group 4 ([place] score 4+4 = 8).  Involving 2 of 2 cores, 60 to 70% and 40 to 50% of prostatic tissue.  Perineural invasion identified.     06/08/2023 - PET scan:  Abnormal PSMA  "PET/CT with hypermetabolism confined to the prostate gland and no convincing evidence of regional pelvic disease or distant metastases.  12/06/2023  Leuprolide 22.5 mg I.m.      09/11/2023 - 10/18/2023 - Completed radiation course:  Received 7000 cGy in 28 fractions to the abdomen and pelvis via IMRT     05/22/2024               Leuprolide 22.5 mg his most recent injection  08/14/2024               Leuprolide 22.5 mg his most recent injection    11/06/2024               Leuprolide 22.5 mg his most recent injection            Current Outpatient Medications:     amLODIPine (NORVASC) 5 MG tablet, TAKE 1 TABLET BY MOUTH EVERY DAY FOR 30 DAYS, Disp: , Rfl:     lisinopril (PRINIVIL,ZESTRIL) 20 MG tablet, Take 1 tablet by mouth Daily., Disp: , Rfl:     lisinopril-hydrochlorothiazide (PRINZIDE,ZESTORETIC) 20-25 MG per tablet, TAKE 1 TABLET BY MOUTH EVERY DAY FOR 90 DAYS, Disp: , Rfl:   Past Medical History:   Diagnosis Date    Hypertension     Prostate cancer      Past Surgical History:   Procedure Laterality Date    HERNIA REPAIR  1979    KNEE SURGERY Left     PROSTATE BIOPSY  04/13/2023    Dr. Berrios           Review of Systems      Objective   PHYSICAL EXAM  Vital Signs:   Ht 175.3 cm (69\")   Wt 84.6 kg (186 lb 6.4 oz)   BMI 27.53 kg/m²     Physical Exam      DATA  Result Review :              Results for orders placed or performed in visit on 01/23/25   POC Urinalysis Dipstick, Multipro    Collection Time: 01/23/25 10:17 AM    Specimen: Urine   Result Value Ref Range    Color Yellow Yellow, Straw, Dark Yellow, Beba    Clarity, UA Clear Clear    Glucose, UA Negative Negative mg/dL    Bilirubin Negative Negative    Ketones, UA Negative Negative    Specific Gravity  1.015 1.005 - 1.030    Blood, UA Trace (A) Negative    pH, Urine 7.0 5.0 - 8.0    Protein, POC Negative Negative mg/dL    Urobilinogen, UA 0.2 E.U./dL Normal, 0.2 E.U./dL    Nitrite, UA Negative Negative    Leukocytes Negative Negative             Lab Results "   Component Value Date    PSA <0.014 01/17/2025    PSA <0.014 07/16/2024    PSA 0.016 12/06/2023                ASSESSMENT AND PLAN          Problem List Items Addressed This Visit    None  Visit Diagnoses       Malignant neoplasm of prostate    -  Primary    Relevant Orders    POC Urinalysis Dipstick, Multipro (Completed)          PSA is <0.2 which is indicative of BENIGNO status.  He continues to do very well.  He will be due 1 more injection of leuprolide in May 2025.  That will be his last injection.          FOLLOW UP     Return in about 6 months (around 7/23/2025) for PSA before visit.        (Please note that portions of this note were completed with a voice recognition program.)  Gopal Titus MD  01/23/25  11:02 CST

## 2025-01-17 ENCOUNTER — LAB (OUTPATIENT)
Dept: LAB | Facility: HOSPITAL | Age: 85
End: 2025-01-17
Payer: MEDICARE

## 2025-01-17 ENCOUNTER — TELEPHONE (OUTPATIENT)
Dept: UROLOGY | Facility: CLINIC | Age: 85
End: 2025-01-17
Payer: MEDICARE

## 2025-01-17 DIAGNOSIS — C61 MALIGNANT NEOPLASM OF PROSTATE: ICD-10-CM

## 2025-01-17 PROCEDURE — 84153 ASSAY OF PSA TOTAL: CPT

## 2025-01-17 PROCEDURE — 36415 COLL VENOUS BLD VENIPUNCTURE: CPT

## 2025-01-18 LAB — PSA SERPL-MCNC: <0.014 NG/ML (ref 0–4)

## 2025-01-23 ENCOUNTER — OFFICE VISIT (OUTPATIENT)
Dept: UROLOGY | Facility: CLINIC | Age: 85
End: 2025-01-23
Payer: MEDICARE

## 2025-01-23 VITALS — HEIGHT: 69 IN | WEIGHT: 186.4 LBS | BODY MASS INDEX: 27.61 KG/M2

## 2025-01-23 DIAGNOSIS — C61 MALIGNANT NEOPLASM OF PROSTATE: Primary | ICD-10-CM

## 2025-01-23 LAB
BILIRUB BLD-MCNC: NEGATIVE MG/DL
CLARITY, POC: CLEAR
COLOR UR: YELLOW
GLUCOSE UR STRIP-MCNC: NEGATIVE MG/DL
KETONES UR QL: NEGATIVE
LEUKOCYTE EST, POC: NEGATIVE
NITRITE UR-MCNC: NEGATIVE MG/ML
PH UR: 7 [PH] (ref 5–8)
PROT UR STRIP-MCNC: NEGATIVE MG/DL
RBC # UR STRIP: ABNORMAL /UL
SP GR UR: 1.01 (ref 1–1.03)
UROBILINOGEN UR QL: ABNORMAL

## 2025-01-29 ENCOUNTER — INFUSION (OUTPATIENT)
Dept: ONCOLOGY | Facility: HOSPITAL | Age: 85
End: 2025-01-29
Payer: MEDICARE

## 2025-01-29 VITALS
HEIGHT: 69 IN | WEIGHT: 187.2 LBS | TEMPERATURE: 97.3 F | DIASTOLIC BLOOD PRESSURE: 73 MMHG | RESPIRATION RATE: 18 BRPM | OXYGEN SATURATION: 95 % | BODY MASS INDEX: 27.73 KG/M2 | HEART RATE: 71 BPM | SYSTOLIC BLOOD PRESSURE: 152 MMHG

## 2025-01-29 DIAGNOSIS — C61 PROSTATE CANCER: Primary | ICD-10-CM

## 2025-01-29 PROCEDURE — 96402 CHEMO HORMON ANTINEOPL SQ/IM: CPT

## 2025-01-29 PROCEDURE — 25010000002 LEUPROLIDE 22.5 MG KIT: Performed by: UROLOGY

## 2025-01-29 RX ADMIN — LEUPROLIDE ACETATE 22.5 MG: 22.5 INJECTION, SUSPENSION, EXTENDED RELEASE SUBCUTANEOUS at 10:47

## 2025-04-23 ENCOUNTER — INFUSION (OUTPATIENT)
Dept: ONCOLOGY | Facility: HOSPITAL | Age: 85
End: 2025-04-23
Payer: MEDICARE

## 2025-04-23 VITALS
HEART RATE: 76 BPM | RESPIRATION RATE: 16 BRPM | BODY MASS INDEX: 27.4 KG/M2 | SYSTOLIC BLOOD PRESSURE: 160 MMHG | HEIGHT: 69 IN | TEMPERATURE: 97.4 F | WEIGHT: 185 LBS | OXYGEN SATURATION: 95 % | DIASTOLIC BLOOD PRESSURE: 74 MMHG

## 2025-04-23 DIAGNOSIS — C61 PROSTATE CANCER: Primary | ICD-10-CM

## 2025-04-23 PROCEDURE — 96402 CHEMO HORMON ANTINEOPL SQ/IM: CPT

## 2025-04-23 PROCEDURE — 25010000002 LEUPROLIDE 22.5 MG KIT: Performed by: UROLOGY

## 2025-04-23 RX ADMIN — LEUPROLIDE ACETATE 22.5 MG: 22.5 INJECTION, SUSPENSION, EXTENDED RELEASE SUBCUTANEOUS at 10:39

## 2025-06-16 ENCOUNTER — HOSPITAL ENCOUNTER (OUTPATIENT)
Dept: GENERAL RADIOLOGY | Facility: HOSPITAL | Age: 85
Discharge: HOME | End: 2025-06-16
Admitting: NURSE PRACTITIONER
Payer: MEDICARE

## 2025-06-16 ENCOUNTER — TRANSCRIBE ORDERS (OUTPATIENT)
Dept: ADMINISTRATIVE | Facility: HOSPITAL | Age: 85
End: 2025-06-16
Payer: MEDICARE

## 2025-06-16 DIAGNOSIS — M25.561 RIGHT KNEE PAIN, UNSPECIFIED CHRONICITY: ICD-10-CM

## 2025-06-16 DIAGNOSIS — M25.561 RIGHT KNEE PAIN, UNSPECIFIED CHRONICITY: Primary | ICD-10-CM

## 2025-06-16 PROCEDURE — 73562 X-RAY EXAM OF KNEE 3: CPT

## 2025-06-16 NOTE — PROGRESS NOTES
Chief Complaint  Prostate cancer    Subjective          Adan Diaz presents to [facility] UROLOGY   He is s/p IMRT + ADT for high risk prostate cancer. Patient denies any possible systemic symptoms of prostate cancer such as weight loss, lower extremity edema, or skeletal pain that could be worrisome for systemic disease. No hematuria or problem with urine stream. No hematochezia. Bowels functioning as normal        Stage IIIC (T3b NX MX) prostate adenocarcinoma with pretreatment PSA of 12.4 and Dori 9 (4+5).  He completed 7000 cGy in 28 fractions to the abdomen/pelvis on 10/18/2023.      08/13/2020 - PSA: 3.520     02/02/2022 - PSA: 8.030     03/14/2022 - PSA: 6.580     07/18/2022 - PSA: 6.5     03/23/2023 - PSA: 12.4     04/13/2023 - Prostate biopsy per :  Left apex:  Acinar adenocarcinoma, grade group 3 ([place] score 4+3 = 7).  Involving 2 of 3 cores, 51 to 60% and 1 to 5% of prostatic tissue.  Percentage of pattern 4: 81 to 90%.  Left mid:  Acinar adenocarcinoma, grade group 3 (Dori score 4+3 = 7).  Involving 2 of 2 cores, 51 to 60% of prostatic tissue per core.  Percentage of pattern 4: 81 to 90%.  Left base:  Acinar adenocarcinoma, grade group 3 (Dori score 4+3 = 7).  Involving 2 of 2 cores, 41-50% of prostatic tissue per core.  Percentage of pattern 4: 71 to 80%.  Perineural invasion identified.  Right apex:  Acinar adenocarcinoma, grade group 3 (Dori score 4+3 = 7).  Involving 2 of 2 cores, 71 to 80% and 21 to 30% of prostatic tissue.  Percentage of pattern 4: Greater than 90%.  Right mid:  Acinar adenocarcinoma, grade group 5 (Dori score 4+5 = 9).  Involving 3 of 3 cores, greater than 90% of prostatic tissue per core.  Percentage of pattern 5: 1 to 5%.  Right base:  Acinar adenocarcinoma, grade group 4 ([place] score 4+4 = 8).  Involving 2 of 2 cores, 60 to 70% and 40 to 50% of prostatic tissue.  Perineural invasion identified.     06/08/2023 - PET  "scan:  Abnormal PSMA PET/CT with hypermetabolism confined to the prostate gland and no convincing evidence of regional pelvic disease or distant metastases.  12/06/2023  Leuprolide 22.5 mg I.m.      09/11/2023 - 10/18/2023 - Completed radiation course:  Received 7000 cGy in 28 fractions to the abdomen and pelvis via IMRT     05/22/2024               Leuprolide 22.5 mg his most recent injection  08/14/2024               Leuprolide 22.5 mg his most recent injection     11/06/2024               Leuprolide 22.5 mg his most recent injection            Current Outpatient Medications:     amLODIPine (NORVASC) 5 MG tablet, TAKE 1 TABLET BY MOUTH EVERY DAY FOR 30 DAYS, Disp: , Rfl:     lisinopril (PRINIVIL,ZESTRIL) 20 MG tablet, Take 1 tablet by mouth Daily., Disp: , Rfl:     lisinopril-hydrochlorothiazide (PRINZIDE,ZESTORETIC) 20-25 MG per tablet, TAKE 1 TABLET BY MOUTH EVERY DAY FOR 90 DAYS, Disp: , Rfl:   Past Medical History:   Diagnosis Date    Hypertension     Prostate cancer      Past Surgical History:   Procedure Laterality Date    HERNIA REPAIR  1979    KNEE SURGERY Left     PROSTATE BIOPSY  04/13/2023    Dr. Berrios           Review of Systems      Objective   PHYSICAL EXAM  Vital Signs:   Temp 97.7 °F (36.5 °C)   Ht 175.3 cm (69\")   Wt 81.5 kg (179 lb 9.6 oz)   BMI 26.52 kg/m²     Physical Exam      DATA  Result Review :              Results for orders placed or performed in visit on 07/24/25   POC Urinalysis Dipstick, Multipro    Collection Time: 07/24/25 10:34 AM    Specimen: Urine   Result Value Ref Range    Color Yellow Yellow, Straw, Dark Yellow, Beba    Clarity, UA Clear Clear    Glucose,  mg/dL (A) Negative mg/dL    Bilirubin Small (1+) (A) Negative    Ketones, UA Trace (A) Negative    Specific Gravity  1.025 1.005 - 1.030    Blood, UA Negative Negative    pH, Urine 6.0 5.0 - 8.0    Protein, POC Trace (A) Negative mg/dL    Urobilinogen, UA 0.2 E.U./dL Normal, 0.2 E.U./dL    Nitrite, UA Negative " Negative    Leukocytes Negative Negative                 Lab Results   Component Value Date    PSA <0.014 07/21/2025    PSA <0.014 01/17/2025    PSA <0.014 07/16/2024            ASSESSMENT AND PLAN          Problem List Items Addressed This Visit    None  Visit Diagnoses         Malignant neoplasm of prostate    -  Primary    Relevant Orders    PSA DIAGNOSTIC (Completed)    POC Urinalysis Dipstick, Multipro (Completed)        PSA is <0.2 which is indicative of BENIGNO status.    That completes his ADT. Will stop his leuprolide.     No concerning symptoms          FOLLOW UP     Return in about 1 year (around 7/24/2026) for PSA before visit.    I've had the privilege to manage this patient almost 2 years for his prostate cancer. This will require ongoing management to include follow-up, possible medication adjustments and other therapies.      (Please note that portions of this note were completed with a voice recognition program.)  Gopal Titus MD  07/24/25  13:35 CDT

## 2025-07-16 ENCOUNTER — INFUSION (OUTPATIENT)
Dept: ONCOLOGY | Facility: HOSPITAL | Age: 85
End: 2025-07-16
Payer: MEDICARE

## 2025-07-16 VITALS
RESPIRATION RATE: 20 BRPM | TEMPERATURE: 97.5 F | DIASTOLIC BLOOD PRESSURE: 67 MMHG | OXYGEN SATURATION: 95 % | SYSTOLIC BLOOD PRESSURE: 148 MMHG | HEIGHT: 69 IN | WEIGHT: 180.8 LBS | BODY MASS INDEX: 26.78 KG/M2 | HEART RATE: 64 BPM

## 2025-07-16 DIAGNOSIS — C61 PROSTATE CANCER: Primary | ICD-10-CM

## 2025-07-16 PROCEDURE — 96402 CHEMO HORMON ANTINEOPL SQ/IM: CPT

## 2025-07-16 PROCEDURE — 25010000002 LEUPROLIDE 22.5 MG KIT: Performed by: UROLOGY

## 2025-07-16 RX ADMIN — LEUPROLIDE ACETATE 22.5 MG: 22.5 INJECTION, SUSPENSION, EXTENDED RELEASE SUBCUTANEOUS at 10:46

## 2025-07-18 ENCOUNTER — TELEPHONE (OUTPATIENT)
Dept: UROLOGY | Facility: CLINIC | Age: 85
End: 2025-07-18
Payer: MEDICARE

## 2025-07-21 ENCOUNTER — TELEPHONE (OUTPATIENT)
Dept: UROLOGY | Facility: CLINIC | Age: 85
End: 2025-07-21
Payer: MEDICARE

## 2025-07-21 ENCOUNTER — LAB (OUTPATIENT)
Dept: LAB | Facility: HOSPITAL | Age: 85
End: 2025-07-21
Payer: MEDICARE

## 2025-07-21 DIAGNOSIS — C61 MALIGNANT NEOPLASM OF PROSTATE: ICD-10-CM

## 2025-07-21 LAB — PSA SERPL-MCNC: <0.014 NG/ML (ref 0–4)

## 2025-07-21 PROCEDURE — 84153 ASSAY OF PSA TOTAL: CPT

## 2025-07-21 PROCEDURE — 36415 COLL VENOUS BLD VENIPUNCTURE: CPT

## 2025-07-24 ENCOUNTER — OFFICE VISIT (OUTPATIENT)
Dept: UROLOGY | Facility: CLINIC | Age: 85
End: 2025-07-24
Payer: MEDICARE

## 2025-07-24 VITALS — WEIGHT: 179.6 LBS | BODY MASS INDEX: 26.6 KG/M2 | HEIGHT: 69 IN | TEMPERATURE: 97.7 F

## 2025-07-24 DIAGNOSIS — C61 MALIGNANT NEOPLASM OF PROSTATE: Primary | ICD-10-CM

## 2025-07-24 LAB
BILIRUB BLD-MCNC: ABNORMAL MG/DL
CLARITY, POC: CLEAR
COLOR UR: YELLOW
GLUCOSE UR STRIP-MCNC: ABNORMAL MG/DL
KETONES UR QL: ABNORMAL
LEUKOCYTE EST, POC: NEGATIVE
NITRITE UR-MCNC: NEGATIVE MG/ML
PH UR: 6 [PH] (ref 5–8)
PROT UR STRIP-MCNC: ABNORMAL MG/DL
RBC # UR STRIP: NEGATIVE /UL
SP GR UR: 1.02 (ref 1–1.03)
UROBILINOGEN UR QL: ABNORMAL

## (undated) DEVICE — NDL HYPO SFTY 22G 11/2IN

## (undated) DEVICE — BAPTIST TURNOVER KIT: Brand: MEDLINE INDUSTRIES, INC.

## (undated) DEVICE — NEEDLE, QUINCKE 22GX3.5": Brand: MEDLINE INDUSTRIES, INC.

## (undated) DEVICE — GLV SURG BIOGEL M LTX PF 8

## (undated) DEVICE — VAGINAL PREP TRAY: Brand: MEDLINE INDUSTRIES, INC.

## (undated) DEVICE — GOWN,NON-REINFORCED,SIRUS,SET IN SLV,XXL: Brand: MEDLINE

## (undated) DEVICE — TOWEL,OR,DSP,ST,BLUE,STD,4/PK,20PK/CS: Brand: MEDLINE

## (undated) DEVICE — PK PROC PROST/RECTL/SPACR BARRIGEL 2/APPL/NDL 3/3ML/GEL/SYR